# Patient Record
Sex: FEMALE | Race: WHITE | Employment: UNEMPLOYED | URBAN - METROPOLITAN AREA
[De-identification: names, ages, dates, MRNs, and addresses within clinical notes are randomized per-mention and may not be internally consistent; named-entity substitution may affect disease eponyms.]

---

## 2020-12-19 ENCOUNTER — HOSPITAL ENCOUNTER (EMERGENCY)
Age: 55
Discharge: HOME | DRG: 463 | End: 2020-12-19
Attending: EMERGENCY MEDICINE
Payer: MEDICAID

## 2020-12-19 ENCOUNTER — APPOINTMENT (OUTPATIENT)
Dept: CT IMAGING | Age: 55
DRG: 463 | End: 2020-12-19
Payer: MEDICAID

## 2020-12-19 VITALS
HEIGHT: 69 IN | OXYGEN SATURATION: 97 % | RESPIRATION RATE: 12 BRPM | HEART RATE: 89 BPM | WEIGHT: 140 LBS | BODY MASS INDEX: 20.73 KG/M2 | TEMPERATURE: 98.2 F

## 2020-12-19 DIAGNOSIS — N10 ACUTE PYELONEPHRITIS: Primary | ICD-10-CM

## 2020-12-19 LAB
-: ABNORMAL
ABSOLUTE EOS #: 0.02 K/UL (ref 0–0.4)
ABSOLUTE IMMATURE GRANULOCYTE: ABNORMAL K/UL (ref 0–0.3)
ABSOLUTE LYMPH #: 0.75 K/UL (ref 1–4.8)
ABSOLUTE MONO #: 0.84 K/UL (ref 0.1–1.2)
ALBUMIN SERPL-MCNC: 4.2 G/DL (ref 3.5–5.2)
ALBUMIN/GLOBULIN RATIO: 1.4 (ref 1–2.5)
ALP BLD-CCNC: 77 U/L (ref 35–104)
ALT SERPL-CCNC: 20 U/L (ref 5–33)
AMORPHOUS: ABNORMAL
ANION GAP SERPL CALCULATED.3IONS-SCNC: 14 MMOL/L (ref 9–17)
AST SERPL-CCNC: 17 U/L
BACTERIA: ABNORMAL
BASOPHILS # BLD: 0 % (ref 0–2)
BASOPHILS ABSOLUTE: 0.02 K/UL (ref 0–0.2)
BILIRUB SERPL-MCNC: 0.41 MG/DL (ref 0.3–1.2)
BILIRUBIN DIRECT: 0.17 MG/DL
BILIRUBIN URINE: NEGATIVE
BILIRUBIN, INDIRECT: 0.24 MG/DL (ref 0–1)
BUN BLDV-MCNC: 14 MG/DL (ref 6–20)
BUN/CREAT BLD: ABNORMAL (ref 9–20)
CALCIUM SERPL-MCNC: 9.2 MG/DL (ref 8.6–10.4)
CASTS UA: ABNORMAL /LPF
CHLORIDE BLD-SCNC: 102 MMOL/L (ref 98–107)
CO2: 22 MMOL/L (ref 20–31)
COLOR: YELLOW
COMMENT UA: ABNORMAL
CREAT SERPL-MCNC: 0.88 MG/DL (ref 0.5–0.9)
CRYSTALS, UA: ABNORMAL /HPF
DIFFERENTIAL TYPE: ABNORMAL
EOSINOPHILS RELATIVE PERCENT: 0 % (ref 1–4)
EPITHELIAL CELLS UA: ABNORMAL /HPF (ref 0–5)
GFR AFRICAN AMERICAN: >60 ML/MIN
GFR NON-AFRICAN AMERICAN: >60 ML/MIN
GFR SERPL CREATININE-BSD FRML MDRD: ABNORMAL ML/MIN/{1.73_M2}
GFR SERPL CREATININE-BSD FRML MDRD: ABNORMAL ML/MIN/{1.73_M2}
GLOBULIN: NORMAL G/DL (ref 1.5–3.8)
GLUCOSE BLD-MCNC: 112 MG/DL (ref 70–99)
GLUCOSE URINE: NEGATIVE
HCT VFR BLD CALC: 38.4 % (ref 36–46)
HEMOGLOBIN: 13.2 G/DL (ref 12–16)
IMMATURE GRANULOCYTES: ABNORMAL %
KETONES, URINE: ABNORMAL
LEUKOCYTE ESTERASE, URINE: ABNORMAL
LIPASE: 13 U/L (ref 13–60)
LYMPHOCYTES # BLD: 7 % (ref 24–44)
MCH RBC QN AUTO: 30.1 PG (ref 26–34)
MCHC RBC AUTO-ENTMCNC: 34.4 G/DL (ref 31–37)
MCV RBC AUTO: 87.5 FL (ref 80–100)
MONOCYTES # BLD: 8 % (ref 2–11)
MUCUS: ABNORMAL
NITRITE, URINE: POSITIVE
NRBC AUTOMATED: ABNORMAL PER 100 WBC
OTHER OBSERVATIONS UA: ABNORMAL
PDW BLD-RTO: 11.5 % (ref 12.5–15.4)
PH UA: 6 (ref 5–8)
PLATELET # BLD: 218 K/UL (ref 140–450)
PLATELET ESTIMATE: ABNORMAL
PMV BLD AUTO: 9 FL (ref 8–14)
POTASSIUM SERPL-SCNC: 3.9 MMOL/L (ref 3.7–5.3)
PROTEIN UA: ABNORMAL
RBC # BLD: 4.39 M/UL (ref 4–5.2)
RBC # BLD: ABNORMAL 10*6/UL
RBC UA: ABNORMAL /HPF (ref 0–2)
RENAL EPITHELIAL, UA: ABNORMAL /HPF
SEG NEUTROPHILS: 85 % (ref 36–66)
SEGMENTED NEUTROPHILS ABSOLUTE COUNT: 8.47 K/UL (ref 1.8–7.7)
SODIUM BLD-SCNC: 138 MMOL/L (ref 135–144)
SPECIFIC GRAVITY UA: 1.02 (ref 1–1.03)
TOTAL PROTEIN: 7.3 G/DL (ref 6.4–8.3)
TRICHOMONAS: ABNORMAL
TURBIDITY: ABNORMAL
URINE HGB: ABNORMAL
UROBILINOGEN, URINE: NORMAL
WBC # BLD: 10.1 K/UL (ref 3.5–11)
WBC # BLD: ABNORMAL 10*3/UL
WBC UA: ABNORMAL /HPF (ref 0–5)
YEAST: ABNORMAL

## 2020-12-19 PROCEDURE — 87186 SC STD MICRODIL/AGAR DIL: CPT

## 2020-12-19 PROCEDURE — 36415 COLL VENOUS BLD VENIPUNCTURE: CPT

## 2020-12-19 PROCEDURE — 87086 URINE CULTURE/COLONY COUNT: CPT

## 2020-12-19 PROCEDURE — 87077 CULTURE AEROBIC IDENTIFY: CPT

## 2020-12-19 PROCEDURE — 81001 URINALYSIS AUTO W/SCOPE: CPT

## 2020-12-19 PROCEDURE — 6360000004 HC RX CONTRAST MEDICATION: Performed by: EMERGENCY MEDICINE

## 2020-12-19 PROCEDURE — 85025 COMPLETE CBC W/AUTO DIFF WBC: CPT

## 2020-12-19 PROCEDURE — 74177 CT ABD & PELVIS W/CONTRAST: CPT

## 2020-12-19 PROCEDURE — 99284 EMERGENCY DEPT VISIT MOD MDM: CPT

## 2020-12-19 PROCEDURE — 80048 BASIC METABOLIC PNL TOTAL CA: CPT

## 2020-12-19 PROCEDURE — 6360000002 HC RX W HCPCS: Performed by: EMERGENCY MEDICINE

## 2020-12-19 PROCEDURE — 83690 ASSAY OF LIPASE: CPT

## 2020-12-19 PROCEDURE — 80076 HEPATIC FUNCTION PANEL: CPT

## 2020-12-19 PROCEDURE — 96376 TX/PRO/DX INJ SAME DRUG ADON: CPT

## 2020-12-19 PROCEDURE — 2580000003 HC RX 258: Performed by: EMERGENCY MEDICINE

## 2020-12-19 PROCEDURE — 96375 TX/PRO/DX INJ NEW DRUG ADDON: CPT

## 2020-12-19 PROCEDURE — 96365 THER/PROPH/DIAG IV INF INIT: CPT

## 2020-12-19 RX ORDER — SODIUM CHLORIDE 0.9 % (FLUSH) 0.9 %
10 SYRINGE (ML) INJECTION PRN
Status: DISCONTINUED | OUTPATIENT
Start: 2020-12-19 | End: 2020-12-19 | Stop reason: HOSPADM

## 2020-12-19 RX ORDER — CIPROFLOXACIN 500 MG/1
500 TABLET, FILM COATED ORAL 2 TIMES DAILY
COMMUNITY
End: 2020-12-19 | Stop reason: ALTCHOICE

## 2020-12-19 RX ORDER — FENTANYL CITRATE 50 UG/ML
50 INJECTION, SOLUTION INTRAMUSCULAR; INTRAVENOUS ONCE
Status: COMPLETED | OUTPATIENT
Start: 2020-12-19 | End: 2020-12-19

## 2020-12-19 RX ORDER — KETOROLAC TROMETHAMINE 15 MG/ML
15 INJECTION, SOLUTION INTRAMUSCULAR; INTRAVENOUS ONCE
Status: COMPLETED | OUTPATIENT
Start: 2020-12-19 | End: 2020-12-19

## 2020-12-19 RX ORDER — ONDANSETRON 2 MG/ML
4 INJECTION INTRAMUSCULAR; INTRAVENOUS ONCE
Status: COMPLETED | OUTPATIENT
Start: 2020-12-19 | End: 2020-12-19

## 2020-12-19 RX ORDER — 0.9 % SODIUM CHLORIDE 0.9 %
80 INTRAVENOUS SOLUTION INTRAVENOUS ONCE
Status: COMPLETED | OUTPATIENT
Start: 2020-12-19 | End: 2020-12-19

## 2020-12-19 RX ORDER — ONDANSETRON 4 MG/1
4 TABLET, FILM COATED ORAL EVERY 6 HOURS PRN
Qty: 10 TABLET | Refills: 0 | Status: SHIPPED | OUTPATIENT
Start: 2020-12-19

## 2020-12-19 RX ORDER — POLYETHYLENE GLYCOL 3350 17 G/17G
17 POWDER, FOR SOLUTION ORAL DAILY
Qty: 225 G | Refills: 0 | Status: ON HOLD | OUTPATIENT
Start: 2020-12-19 | End: 2020-12-23 | Stop reason: SDUPTHER

## 2020-12-19 RX ORDER — CEPHALEXIN 500 MG/1
500 CAPSULE ORAL 3 TIMES DAILY
Qty: 30 CAPSULE | Refills: 0 | Status: ON HOLD | OUTPATIENT
Start: 2020-12-19 | End: 2020-12-23 | Stop reason: HOSPADM

## 2020-12-19 RX ORDER — HYDROCODONE BITARTRATE AND ACETAMINOPHEN 5; 325 MG/1; MG/1
1 TABLET ORAL EVERY 6 HOURS PRN
Qty: 16 TABLET | Refills: 0 | Status: SHIPPED | OUTPATIENT
Start: 2020-12-19 | End: 2020-12-23

## 2020-12-19 RX ORDER — 0.9 % SODIUM CHLORIDE 0.9 %
1000 INTRAVENOUS SOLUTION INTRAVENOUS ONCE
Status: COMPLETED | OUTPATIENT
Start: 2020-12-19 | End: 2020-12-19

## 2020-12-19 RX ADMIN — KETOROLAC TROMETHAMINE 15 MG: 15 INJECTION, SOLUTION INTRAMUSCULAR; INTRAVENOUS at 13:03

## 2020-12-19 RX ADMIN — IOPAMIDOL 75 ML: 755 INJECTION, SOLUTION INTRAVENOUS at 12:05

## 2020-12-19 RX ADMIN — FENTANYL CITRATE 50 MCG: 50 INJECTION, SOLUTION INTRAMUSCULAR; INTRAVENOUS at 10:29

## 2020-12-19 RX ADMIN — SODIUM CHLORIDE 1000 ML: 9 INJECTION, SOLUTION INTRAVENOUS at 10:23

## 2020-12-19 RX ADMIN — Medication 10 ML: at 12:06

## 2020-12-19 RX ADMIN — ONDANSETRON 4 MG: 2 INJECTION INTRAMUSCULAR; INTRAVENOUS at 13:02

## 2020-12-19 RX ADMIN — SODIUM CHLORIDE 80 ML: 9 INJECTION, SOLUTION INTRAVENOUS at 12:06

## 2020-12-19 RX ADMIN — ONDANSETRON 4 MG: 2 INJECTION INTRAMUSCULAR; INTRAVENOUS at 10:24

## 2020-12-19 RX ADMIN — CEFTRIAXONE SODIUM 1 G: 1 INJECTION, POWDER, FOR SOLUTION INTRAMUSCULAR; INTRAVENOUS at 11:44

## 2020-12-19 ASSESSMENT — ENCOUNTER SYMPTOMS
COUGH: 0
NAUSEA: 1
SHORTNESS OF BREATH: 0

## 2020-12-19 ASSESSMENT — PAIN SCALES - GENERAL
PAINLEVEL_OUTOF10: 3
PAINLEVEL_OUTOF10: 5
PAINLEVEL_OUTOF10: 5
PAINLEVEL_OUTOF10: 4
PAINLEVEL_OUTOF10: 5

## 2020-12-19 NOTE — ED PROVIDER NOTES
[facility] ED  EMERGENCY DEPARTMENT ENCOUNTER      Pt Name: Sung Genao  MRN: [id number]  [place] 1965  Date of evaluation: 12/19/2020  Provider: Hardik Bourgeois MD    CHIEF COMPLAINT     Chief Complaint   Patient presents with    Flank Pain         HISTORY OF PRESENT ILLNESS   (Location/Symptom, Timing/Onset, Context/Setting,Quality, Duration, Modifying Factors, Severity)  Note limiting factors. Sung Genao is a 54 y.o. female who presents to the emergency department with a chief complaint of left-sided abdominal pain that she states she cannot localize well. This started about a week ago. She was diagnosed with urinary tract infection and was placed on Cipro. She has taken 5 doses but it makes her nauseated so she stopped. She denies vomiting or fever. The history is provided by the patient and a friend. Nursing Notes werereviewed. REVIEW OF SYSTEMS    (2-9 systems for level 4, 10 or more for level 5)     Review of Systems   Constitutional: Positive for fatigue. Negative for fever. Respiratory: Negative for cough and shortness of breath. Gastrointestinal: Positive for nausea. Musculoskeletal: Negative for myalgias. All other systems reviewed and are negative. Except as noted above the remainder of the review of systems was reviewed and negative. PAST MEDICAL HISTORY   History reviewed. No pertinent past medical history. SURGICALHISTORY     History reviewed. No pertinent surgical history. CURRENT MEDICATIONS       Discharge Medication List as of 12/19/2020  2:06 PM      CONTINUE these medications which have NOT CHANGED    Details   estrogens, conjugated, (PREMARIN) 0.3 MG tablet Take 0.3 mg by mouth dailyHistorical Med      progesterone (PROMETRIUM) 200 MG capsule Take 400 mg by mouth dailyHistorical Med             ALLERGIES     Aspirin and Pcn [penicillins]    FAMILY HISTORY     History reviewed. No pertinent family history.        SOCIAL HISTORY Social History     Socioeconomic History    Marital status: Single     Spouse name: None    Number of children: None    Years of education: None    Highest education level: None   Occupational History    None   Social Needs    Financial resource strain: None    Food insecurity     Worry: None     Inability: None    Transportation needs     Medical: None     Non-medical: None   Tobacco Use    Smoking status: Never Smoker    Smokeless tobacco: Never Used   Substance and Sexual Activity    Alcohol use: Yes     Comment: 1/day    Drug use: Never    Sexual activity: Never   Lifestyle    Physical activity     Days per week: None     Minutes per session: None    Stress: None   Relationships    Social connections     Talks on phone: None     Gets together: None     Attends Mandaeism service: None     Active member of club or organization: None     Attends meetings of clubs or organizations: None     Relationship status: None    Intimate partner violence     Fear of current or ex partner: None     Emotionally abused: None     Physically abused: None     Forced sexual activity: None   Other Topics Concern    None   Social History Narrative    None       SCREENINGS             PHYSICAL EXAM    (up to 7 for level 4, 8 or more for level 5)     ED Triage Vitals [12/19/20 0958]   BP Temp Temp Source Pulse Resp SpO2 Height Weight   -- 98.2 °F (36.8 °C) Oral 89 12 97 % 5' 9\" (1.753 m) 140 lb (63.5 kg)       Physical Exam  Vitals signs reviewed. Constitutional:       Appearance: She is ill-appearing. HENT:      Head: Normocephalic. Right Ear: External ear normal.      Left Ear: External ear normal.      Nose: Nose normal.   Eyes:      Extraocular Movements: Extraocular movements intact. Neck:      Musculoskeletal: Neck supple. Cardiovascular:      Rate and Rhythm: Normal rate and regular rhythm. Pulmonary:      Effort: Pulmonary effort is normal.      Breath sounds: Normal breath sounds. Abdominal:      Comments: Soft, diffusely tender more so in the left flank and left lower quadrant and left CVA region. No peritoneal signs or rigidity. No organomegaly or fluid wave. Musculoskeletal: Normal range of motion. Skin:     General: Skin is warm and dry. Neurological:      General: No focal deficit present. Mental Status: She is alert and oriented to person, place, and time. DIAGNOSTIC RESULTS     EKG: All EKG's are interpreted by the Emergency Department Physician who either signs orCo-signs this chart in the absence of a cardiologist.    RADIOLOGY:   Non-plain film images such as CT, Ultrasound and MRI are read by the radiologist. Plain radiographic images are visualized and preliminarily interpreted by the emergency physician with the below findings:    Interpretation per the Radiologist below, ifavailable at the time of this note:    CT ABDOMEN PELVIS W IV CONTRAST Additional Contrast? None   Final Result   1. Left pyelonephritis. 2. Suspicion for left ureteritis and cystitis. 3. Endometrial thickening for age. Recommend further evaluation with   sonography on a nonemergent basis. Uterine heterogeneous enhancement and   relative enlargement also be assessed at that time, most likely due to   fibroids and/or adenomyosis. 4. Additional incidental findings as above for which no dedicated follow-up   is recommend.                ED BEDSIDE ULTRASOUND:   Performed by ED Physician - none    LABS:  Labs Reviewed   CBC WITH AUTO DIFFERENTIAL - Abnormal; Notable for the following components:       Result Value    RDW 11.5 (*)     Seg Neutrophils 85 (*)     Lymphocytes 7 (*)     Eosinophils % 0 (*)     Segs Absolute 8.47 (*)     Absolute Lymph # 0.75 (*)     All other components within normal limits   BASIC METABOLIC PANEL - Abnormal; Notable for the following components:    Glucose 112 (*)     All other components within normal limits   URINALYSIS - Abnormal; Notable for the following components:    Turbidity UA CLOUDY (*)     Ketones, Urine MODERATE (*)     Urine Hgb SMALL (*)     Protein, UA 1+ (*)     Nitrite, Urine POSITIVE (*)     Leukocyte Esterase, Urine MODERATE (*)     All other components within normal limits   MICROSCOPIC URINALYSIS - Abnormal; Notable for the following components:    Bacteria, UA MANY (*)     Mucus, UA 1+ (*)     All other components within normal limits   HEPATIC FUNCTION PANEL   LIPASE       All other labs were within normal range ornot returned as of this dictation. EMERGENCY DEPARTMENT COURSE and DIFFERENTIAL DIAGNOSIS/MDM:   Vitals:    Vitals:    12/19/20 0958   Pulse: 89   Resp: 12   Temp: 98.2 °F (36.8 °C)   TempSrc: Oral   SpO2: 97%   Weight: 63.5 kg (140 lb)   Height: 5' 9\" (1.753 m)            Urinalysis is consistent with infection and CT scan of the brain is consistent with left pyelonephritis. Patient likely has a fibroid uterus. She is treated with IV Rocephin in the ED and for pain management was given IV fentanyl, IV Toradol and 2 doses of IV Zofran 4 mg each. She also has been bolused with a liter of IV normal saline and feels better. Upon discharge she is placed on Zofran and Keflex. She is also prescribed Norco for pain and MiraLAX to prevent constipation. Patient was living in [facility] and has moved up into the region with a significant other who lives in [facility] which is her eventual destination. She was passing through [place] when she got sick. She will be able to [facility] a [facility] care physician [facility] with the plan to spend a couple days in the areas until she gets better. She is advised to return to the emergency department anytime for worsening of symptoms. MDM    CONSULTS:  None    PROCEDURES:  Unlessotherwise noted below, none     Procedures    FINAL IMPRESSION      1.  Acute pyelonephritis          DISPOSITION/PLAN   DISPOSITION Decision To Discharge 12/19/2020 02:03:47 PM      PATIENT REFERRED TO:  Your physician    In 1 week        DISCHARGE MEDICATIONS:         Problem List:  There is no problem list on file for this patient. Summation      Patient Course: Discharged. ED Medicationsadministered this visit:    Medications   sodium chloride flush 0.9 % injection 10 mL (10 mLs Intravenous Given 12/19/20 1206)   0.9 % sodium chloride bolus (0 mLs Intravenous Stopped 12/19/20 1123)   ondansetron (ZOFRAN) injection 4 mg (4 mg Intravenous Given 12/19/20 1024)   fentaNYL (SUBLIMAZE) injection 50 mcg (50 mcg Intravenous Given 12/19/20 1029)   cefTRIAXone (ROCEPHIN) 1 g IVPB in 50 mL D5W minibag (0 g Intravenous Stopped 12/19/20 1214)   0.9 % sodium chloride bolus (0 mLs Intravenous Stopped 12/19/20 1207)   iopamidol (ISOVUE-370) 76 % injection 75 mL (75 mLs Intravenous Given 12/19/20 1205)   ondansetron (ZOFRAN) injection 4 mg (4 mg Intravenous Given 12/19/20 1302)   ketorolac (TORADOL) injection 15 mg (15 mg Intravenous Given 12/19/20 1303)       New Prescriptions from this visit:    Discharge Medication List as of 12/19/2020  2:06 PM      START taking these medications    Details   ondansetron (ZOFRAN) 4 MG tablet Take 1 tablet by mouth every 6 hours as needed for Nausea or Vomiting, Disp-10 tablet, R-0Print      cephALEXin (KEFLEX) 500 MG capsule Take 1 capsule by mouth 3 times daily, Disp-30 capsule, R-0Print      HYDROcodone-acetaminophen (NORCO) 5-325 MG per tablet Take 1 tablet by mouth every 6 hours as needed for Pain for up to 4 days. , Disp-16 tablet, R-0Print      polyethylene glycol (MIRALAX) 17 GM/SCOOP powder Take 17 g by mouth daily PRN constipation, Disp-225 g, R-0Print             Follow-up:  Your physician    In 1 week          Final Impression:   1.  Acute pyelonephritis               (Please note that portions of this note were completed with a voice recognitionprogram.  Efforts were made to edit the dictations but occasionally words are mis-transcribed.)    Izabella Lauren MD

## 2020-12-19 NOTE — ED NOTES
Patient to ED with c/o left flank pain with urinary symptoms for past week. Was seen via telemed and giben Rx for Cipro for a suspected UTI. After 3 days of ATB, Cipro dose increased to 500mg from 250mg. Today, patient in increased pain, reports having frequent urination and that urine is discolored. No h/o kidney stones. Has fine, red, raised rash to chest from Cipro.        Morro Escobedo RN  12/19/20 5928

## 2020-12-21 ENCOUNTER — APPOINTMENT (OUTPATIENT)
Dept: CT IMAGING | Age: 55
DRG: 463 | End: 2020-12-21
Payer: MEDICAID

## 2020-12-21 ENCOUNTER — APPOINTMENT (OUTPATIENT)
Dept: ULTRASOUND IMAGING | Age: 55
DRG: 463 | End: 2020-12-21
Payer: MEDICAID

## 2020-12-21 ENCOUNTER — HOSPITAL ENCOUNTER (INPATIENT)
Age: 55
LOS: 2 days | Discharge: HOME | DRG: 463 | End: 2020-12-23
Attending: EMERGENCY MEDICINE | Admitting: INTERNAL MEDICINE
Payer: MEDICAID

## 2020-12-21 DIAGNOSIS — N12 PYELONEPHRITIS OF LEFT KIDNEY: Primary | ICD-10-CM

## 2020-12-21 DIAGNOSIS — R51.9 RIGHT-SIDED HEADACHE: ICD-10-CM

## 2020-12-21 LAB
ABSOLUTE EOS #: 0.1 K/UL (ref 0–0.4)
ABSOLUTE IMMATURE GRANULOCYTE: ABNORMAL K/UL (ref 0–0.3)
ABSOLUTE LYMPH #: 0.6 K/UL (ref 1–4.8)
ABSOLUTE MONO #: 0.5 K/UL (ref 0.1–1.2)
ALBUMIN SERPL-MCNC: 3.4 G/DL (ref 3.5–5.2)
ALBUMIN/GLOBULIN RATIO: 1 (ref 1–2.5)
ALP BLD-CCNC: 122 U/L (ref 35–104)
ALT SERPL-CCNC: 93 U/L (ref 5–33)
ANION GAP SERPL CALCULATED.3IONS-SCNC: 13 MMOL/L (ref 9–17)
AST SERPL-CCNC: 57 U/L
BASOPHILS # BLD: 1 % (ref 0–2)
BASOPHILS ABSOLUTE: 0 K/UL (ref 0–0.2)
BILIRUB SERPL-MCNC: 0.22 MG/DL (ref 0.3–1.2)
BILIRUBIN DIRECT: 0.1 MG/DL
BILIRUBIN URINE: NEGATIVE
BILIRUBIN, INDIRECT: 0.12 MG/DL (ref 0–1)
BUN BLDV-MCNC: 11 MG/DL (ref 6–20)
BUN/CREAT BLD: ABNORMAL (ref 9–20)
CALCIUM SERPL-MCNC: 8.7 MG/DL (ref 8.6–10.4)
CHLORIDE BLD-SCNC: 104 MMOL/L (ref 98–107)
CO2: 23 MMOL/L (ref 20–31)
COLOR: YELLOW
COMMENT UA: NORMAL
CREAT SERPL-MCNC: 0.88 MG/DL (ref 0.5–0.9)
DIFFERENTIAL TYPE: ABNORMAL
EOSINOPHILS RELATIVE PERCENT: 2 % (ref 1–4)
GFR AFRICAN AMERICAN: >60 ML/MIN
GFR NON-AFRICAN AMERICAN: >60 ML/MIN
GFR SERPL CREATININE-BSD FRML MDRD: ABNORMAL ML/MIN/{1.73_M2}
GFR SERPL CREATININE-BSD FRML MDRD: ABNORMAL ML/MIN/{1.73_M2}
GLOBULIN: ABNORMAL G/DL (ref 1.5–3.8)
GLUCOSE BLD-MCNC: 110 MG/DL (ref 70–99)
GLUCOSE URINE: NEGATIVE
HCG(URINE) PREGNANCY TEST: NEGATIVE
HCT VFR BLD CALC: 38.4 % (ref 36–46)
HEMOGLOBIN: 12.9 G/DL (ref 12–16)
IMMATURE GRANULOCYTES: ABNORMAL %
KETONES, URINE: NEGATIVE
LACTIC ACID, SEPSIS WHOLE BLOOD: NORMAL MMOL/L (ref 0.5–1.9)
LACTIC ACID, SEPSIS: 1.2 MMOL/L (ref 0.5–1.9)
LEUKOCYTE ESTERASE, URINE: NEGATIVE
LIPASE: 13 U/L (ref 13–60)
LYMPHOCYTES # BLD: 16 % (ref 24–44)
MCH RBC QN AUTO: 29.9 PG (ref 26–34)
MCHC RBC AUTO-ENTMCNC: 33.5 G/DL (ref 31–37)
MCV RBC AUTO: 89.2 FL (ref 80–100)
MONOCYTES # BLD: 13 % (ref 2–11)
NITRITE, URINE: NEGATIVE
NRBC AUTOMATED: ABNORMAL PER 100 WBC
PDW BLD-RTO: 12 % (ref 12.5–15.4)
PH UA: 6 (ref 5–8)
PLATELET # BLD: 262 K/UL (ref 140–450)
PLATELET ESTIMATE: ABNORMAL
PMV BLD AUTO: 7.3 FL (ref 6–12)
POTASSIUM SERPL-SCNC: 3.8 MMOL/L (ref 3.7–5.3)
PROTEIN UA: NEGATIVE
RBC # BLD: 4.31 M/UL (ref 4–5.2)
RBC # BLD: ABNORMAL 10*6/UL
SARS-COV-2, RAPID: NOT DETECTED
SARS-COV-2: NORMAL
SARS-COV-2: NORMAL
SEG NEUTROPHILS: 68 % (ref 36–66)
SEGMENTED NEUTROPHILS ABSOLUTE COUNT: 2.5 K/UL (ref 1.8–7.7)
SODIUM BLD-SCNC: 140 MMOL/L (ref 135–144)
SOURCE: NORMAL
SPECIFIC GRAVITY UA: 1.01 (ref 1–1.03)
TOTAL PROTEIN: 6.9 G/DL (ref 6.4–8.3)
TURBIDITY: CLEAR
URINE HGB: NEGATIVE
UROBILINOGEN, URINE: NORMAL
WBC # BLD: 3.6 K/UL (ref 3.5–11)
WBC # BLD: ABNORMAL 10*3/UL

## 2020-12-21 PROCEDURE — 6360000002 HC RX W HCPCS: Performed by: NURSE PRACTITIONER

## 2020-12-21 PROCEDURE — 85025 COMPLETE CBC W/AUTO DIFF WBC: CPT

## 2020-12-21 PROCEDURE — 2580000003 HC RX 258: Performed by: PHYSICIAN ASSISTANT

## 2020-12-21 PROCEDURE — 93976 VASCULAR STUDY: CPT

## 2020-12-21 PROCEDURE — 36415 COLL VENOUS BLD VENIPUNCTURE: CPT

## 2020-12-21 PROCEDURE — 99285 EMERGENCY DEPT VISIT HI MDM: CPT

## 2020-12-21 PROCEDURE — 80076 HEPATIC FUNCTION PANEL: CPT

## 2020-12-21 PROCEDURE — 96376 TX/PRO/DX INJ SAME DRUG ADON: CPT

## 2020-12-21 PROCEDURE — 81003 URINALYSIS AUTO W/O SCOPE: CPT

## 2020-12-21 PROCEDURE — 81025 URINE PREGNANCY TEST: CPT

## 2020-12-21 PROCEDURE — 80048 BASIC METABOLIC PNL TOTAL CA: CPT

## 2020-12-21 PROCEDURE — 1210000000 HC MED SURG R&B

## 2020-12-21 PROCEDURE — 83605 ASSAY OF LACTIC ACID: CPT

## 2020-12-21 PROCEDURE — 87086 URINE CULTURE/COLONY COUNT: CPT

## 2020-12-21 PROCEDURE — 6360000004 HC RX CONTRAST MEDICATION: Performed by: PHYSICIAN ASSISTANT

## 2020-12-21 PROCEDURE — 6360000002 HC RX W HCPCS: Performed by: PHYSICIAN ASSISTANT

## 2020-12-21 PROCEDURE — 70450 CT HEAD/BRAIN W/O DYE: CPT

## 2020-12-21 PROCEDURE — 6370000000 HC RX 637 (ALT 250 FOR IP): Performed by: NURSE PRACTITIONER

## 2020-12-21 PROCEDURE — 2580000003 HC RX 258: Performed by: NURSE PRACTITIONER

## 2020-12-21 PROCEDURE — U0002 COVID-19 LAB TEST NON-CDC: HCPCS

## 2020-12-21 PROCEDURE — 76705 ECHO EXAM OF ABDOMEN: CPT

## 2020-12-21 PROCEDURE — U0003 INFECTIOUS AGENT DETECTION BY NUCLEIC ACID (DNA OR RNA); SEVERE ACUTE RESPIRATORY SYNDROME CORONAVIRUS 2 (SARS-COV-2) (CORONAVIRUS DISEASE [COVID-19]), AMPLIFIED PROBE TECHNIQUE, MAKING USE OF HIGH THROUGHPUT TECHNOLOGIES AS DESCRIBED BY CMS-2020-01-R: HCPCS

## 2020-12-21 PROCEDURE — 83690 ASSAY OF LIPASE: CPT

## 2020-12-21 PROCEDURE — 76830 TRANSVAGINAL US NON-OB: CPT

## 2020-12-21 PROCEDURE — 96365 THER/PROPH/DIAG IV INF INIT: CPT

## 2020-12-21 PROCEDURE — 74177 CT ABD & PELVIS W/CONTRAST: CPT

## 2020-12-21 PROCEDURE — 96375 TX/PRO/DX INJ NEW DRUG ADDON: CPT

## 2020-12-21 PROCEDURE — 76856 US EXAM PELVIC COMPLETE: CPT

## 2020-12-21 RX ORDER — 0.9 % SODIUM CHLORIDE 0.9 %
80 INTRAVENOUS SOLUTION INTRAVENOUS ONCE
Status: COMPLETED | OUTPATIENT
Start: 2020-12-21 | End: 2020-12-21

## 2020-12-21 RX ORDER — ONDANSETRON 2 MG/ML
4 INJECTION INTRAMUSCULAR; INTRAVENOUS ONCE
Status: COMPLETED | OUTPATIENT
Start: 2020-12-21 | End: 2020-12-21

## 2020-12-21 RX ORDER — ACETAMINOPHEN 325 MG/1
650 TABLET ORAL EVERY 6 HOURS PRN
Status: DISCONTINUED | OUTPATIENT
Start: 2020-12-21 | End: 2020-12-23 | Stop reason: HOSPADM

## 2020-12-21 RX ORDER — FENTANYL CITRATE 50 UG/ML
25 INJECTION, SOLUTION INTRAMUSCULAR; INTRAVENOUS ONCE
Status: COMPLETED | OUTPATIENT
Start: 2020-12-21 | End: 2020-12-21

## 2020-12-21 RX ORDER — ACETAMINOPHEN 650 MG/1
650 SUPPOSITORY RECTAL EVERY 6 HOURS PRN
Status: DISCONTINUED | OUTPATIENT
Start: 2020-12-21 | End: 2020-12-23 | Stop reason: HOSPADM

## 2020-12-21 RX ORDER — ONDANSETRON 2 MG/ML
4 INJECTION INTRAMUSCULAR; INTRAVENOUS EVERY 6 HOURS PRN
Status: DISCONTINUED | OUTPATIENT
Start: 2020-12-21 | End: 2020-12-23 | Stop reason: HOSPADM

## 2020-12-21 RX ORDER — 0.9 % SODIUM CHLORIDE 0.9 %
1000 INTRAVENOUS SOLUTION INTRAVENOUS ONCE
Status: COMPLETED | OUTPATIENT
Start: 2020-12-21 | End: 2020-12-21

## 2020-12-21 RX ORDER — KETOROLAC TROMETHAMINE 30 MG/ML
30 INJECTION, SOLUTION INTRAMUSCULAR; INTRAVENOUS ONCE
Status: DISCONTINUED | OUTPATIENT
Start: 2020-12-21 | End: 2020-12-21

## 2020-12-21 RX ORDER — SODIUM CHLORIDE 0.9 % (FLUSH) 0.9 %
10 SYRINGE (ML) INJECTION EVERY 12 HOURS SCHEDULED
Status: DISCONTINUED | OUTPATIENT
Start: 2020-12-21 | End: 2020-12-23 | Stop reason: HOSPADM

## 2020-12-21 RX ORDER — SODIUM CHLORIDE 9 MG/ML
INJECTION, SOLUTION INTRAVENOUS CONTINUOUS
Status: DISCONTINUED | OUTPATIENT
Start: 2020-12-21 | End: 2020-12-23 | Stop reason: HOSPADM

## 2020-12-21 RX ORDER — SODIUM CHLORIDE 0.9 % (FLUSH) 0.9 %
10 SYRINGE (ML) INJECTION PRN
Status: DISCONTINUED | OUTPATIENT
Start: 2020-12-21 | End: 2020-12-21

## 2020-12-21 RX ORDER — PROMETHAZINE HYDROCHLORIDE 25 MG/1
12.5 TABLET ORAL EVERY 6 HOURS PRN
Status: DISCONTINUED | OUTPATIENT
Start: 2020-12-21 | End: 2020-12-23 | Stop reason: HOSPADM

## 2020-12-21 RX ORDER — MORPHINE SULFATE 4 MG/ML
4 INJECTION, SOLUTION INTRAMUSCULAR; INTRAVENOUS ONCE
Status: COMPLETED | OUTPATIENT
Start: 2020-12-21 | End: 2020-12-21

## 2020-12-21 RX ORDER — KETOROLAC TROMETHAMINE 30 MG/ML
30 INJECTION, SOLUTION INTRAMUSCULAR; INTRAVENOUS ONCE
Status: COMPLETED | OUTPATIENT
Start: 2020-12-21 | End: 2020-12-21

## 2020-12-21 RX ORDER — SODIUM CHLORIDE 0.9 % (FLUSH) 0.9 %
10 SYRINGE (ML) INJECTION PRN
Status: DISCONTINUED | OUTPATIENT
Start: 2020-12-21 | End: 2020-12-23 | Stop reason: HOSPADM

## 2020-12-21 RX ORDER — POLYETHYLENE GLYCOL 3350 17 G/17G
17 POWDER, FOR SOLUTION ORAL DAILY
Status: DISCONTINUED | OUTPATIENT
Start: 2020-12-21 | End: 2020-12-22

## 2020-12-21 RX ORDER — NICOTINE 21 MG/24HR
1 PATCH, TRANSDERMAL 24 HOURS TRANSDERMAL DAILY PRN
Status: DISCONTINUED | OUTPATIENT
Start: 2020-12-21 | End: 2020-12-23 | Stop reason: HOSPADM

## 2020-12-21 RX ORDER — DIPHENHYDRAMINE HYDROCHLORIDE 50 MG/ML
25 INJECTION INTRAMUSCULAR; INTRAVENOUS ONCE
Status: COMPLETED | OUTPATIENT
Start: 2020-12-21 | End: 2020-12-21

## 2020-12-21 RX ADMIN — PIPERACILLIN AND TAZOBACTAM 3.38 G: 3; .375 INJECTION, POWDER, LYOPHILIZED, FOR SOLUTION INTRAVENOUS at 20:37

## 2020-12-21 RX ADMIN — SODIUM CHLORIDE 1000 ML: 9 INJECTION, SOLUTION INTRAVENOUS at 14:48

## 2020-12-21 RX ADMIN — ONDANSETRON 4 MG: 2 INJECTION INTRAMUSCULAR; INTRAVENOUS at 17:18

## 2020-12-21 RX ADMIN — ONDANSETRON 4 MG: 2 INJECTION INTRAMUSCULAR; INTRAVENOUS at 23:12

## 2020-12-21 RX ADMIN — FENTANYL CITRATE 25 MCG: 50 INJECTION, SOLUTION INTRAMUSCULAR; INTRAVENOUS at 14:48

## 2020-12-21 RX ADMIN — POLYETHYLENE GLYCOL 3350 17 G: 17 POWDER, FOR SOLUTION ORAL at 23:11

## 2020-12-21 RX ADMIN — SODIUM CHLORIDE 1000 ML: 9 INJECTION, SOLUTION INTRAVENOUS at 15:55

## 2020-12-21 RX ADMIN — DIPHENHYDRAMINE HYDROCHLORIDE 25 MG: 50 INJECTION, SOLUTION INTRAMUSCULAR; INTRAVENOUS at 17:18

## 2020-12-21 RX ADMIN — MORPHINE SULFATE 4 MG: 4 INJECTION INTRAVENOUS at 21:24

## 2020-12-21 RX ADMIN — KETOROLAC TROMETHAMINE 30 MG: 30 INJECTION, SOLUTION INTRAMUSCULAR at 17:39

## 2020-12-21 RX ADMIN — IOPAMIDOL 75 ML: 755 INJECTION, SOLUTION INTRAVENOUS at 16:53

## 2020-12-21 RX ADMIN — CEFTRIAXONE SODIUM 1 G: 1 INJECTION, POWDER, FOR SOLUTION INTRAMUSCULAR; INTRAVENOUS at 17:05

## 2020-12-21 RX ADMIN — Medication 10 ML: at 16:53

## 2020-12-21 RX ADMIN — ONDANSETRON 4 MG: 2 INJECTION INTRAMUSCULAR; INTRAVENOUS at 14:49

## 2020-12-21 RX ADMIN — SODIUM CHLORIDE 80 ML: 9 INJECTION, SOLUTION INTRAVENOUS at 16:53

## 2020-12-21 RX ADMIN — FENTANYL CITRATE 25 MCG: 50 INJECTION, SOLUTION INTRAMUSCULAR; INTRAVENOUS at 16:35

## 2020-12-21 RX ADMIN — SODIUM CHLORIDE: 9 INJECTION, SOLUTION INTRAVENOUS at 20:36

## 2020-12-21 ASSESSMENT — ENCOUNTER SYMPTOMS
COUGH: 0
SORE THROAT: 0
NAUSEA: 0
EYE REDNESS: 0
ABDOMINAL PAIN: 1
VOMITING: 0
SHORTNESS OF BREATH: 0
EYE DISCHARGE: 0

## 2020-12-21 ASSESSMENT — PAIN DESCRIPTION - LOCATION
LOCATION: EYE;HEAD
LOCATION: ABDOMEN;BACK

## 2020-12-21 ASSESSMENT — PAIN SCALES - GENERAL
PAINLEVEL_OUTOF10: 7
PAINLEVEL_OUTOF10: 10
PAINLEVEL_OUTOF10: 8
PAINLEVEL_OUTOF10: 7
PAINLEVEL_OUTOF10: 10
PAINLEVEL_OUTOF10: 6
PAINLEVEL_OUTOF10: 4
PAINLEVEL_OUTOF10: 6
PAINLEVEL_OUTOF10: 2

## 2020-12-21 ASSESSMENT — PAIN DESCRIPTION - PAIN TYPE
TYPE: ACUTE PAIN
TYPE: ACUTE PAIN

## 2020-12-21 ASSESSMENT — PAIN - FUNCTIONAL ASSESSMENT: PAIN_FUNCTIONAL_ASSESSMENT: PREVENTS OR INTERFERES SOME ACTIVE ACTIVITIES AND ADLS

## 2020-12-21 ASSESSMENT — PAIN DESCRIPTION - ORIENTATION
ORIENTATION: LEFT;LOWER
ORIENTATION: RIGHT

## 2020-12-21 ASSESSMENT — PAIN DESCRIPTION - FREQUENCY: FREQUENCY: CONTINUOUS

## 2020-12-21 ASSESSMENT — PAIN SCALES - WONG BAKER: WONGBAKER_NUMERICALRESPONSE: 2

## 2020-12-21 ASSESSMENT — PAIN DESCRIPTION - DESCRIPTORS: DESCRIPTORS: OTHER (COMMENT)

## 2020-12-21 NOTE — ED PROVIDER NOTES
(2-9 systems for level 4, 10 or more for level 5)    Review of Systems   Constitutional: Positive for chills. Sweats   HENT: Negative for congestion, ear pain and sore throat. Eyes: Negative for discharge and redness. Respiratory: Negative for cough and shortness of breath. Cardiovascular: Negative for chest pain. Gastrointestinal: Positive for abdominal pain. Negative for nausea and vomiting. Genitourinary: Positive for flank pain and hematuria. Musculoskeletal: Negative for joint swelling and neck pain. Skin: Positive for rash. Neurological: Positive for headaches. Negative for seizures and syncope. All other systems reviewed and are negative. PAST MEDICAL HISTORY   Left pyelonephritis    SURGICAL HISTORY      has no past surgical history on file. CURRENT MEDICATIONS       Current Discharge Medication List      CONTINUE these medications which have NOT CHANGED    Details   estrogens, conjugated, (PREMARIN) 0.3 MG tablet Take 0.3 mg by mouth daily      progesterone (PROMETRIUM) 200 MG capsule Take 400 mg by mouth daily      ondansetron (ZOFRAN) 4 MG tablet Take 1 tablet by mouth every 6 hours as needed for Nausea or Vomiting  Qty: 10 tablet, Refills: 0      cephALEXin (KEFLEX) 500 MG capsule Take 1 capsule by mouth 3 times daily  Qty: 30 capsule, Refills: 0      HYDROcodone-acetaminophen (NORCO) 5-325 MG per tablet Take 1 tablet by mouth every 6 hours as needed for Pain for up to 4 days. Qty: 16 tablet, Refills: 0    Associated Diagnoses: Acute pyelonephritis      polyethylene glycol (MIRALAX) 17 GM/SCOOP powder Take 17 g by mouth daily PRN constipation  Qty: 225 g, Refills: 0           ALLERGIES     is allergic to aspirin and pcn [penicillins]. FAMILY HISTORY     Not relevant to the current problem. SOCIAL HISTORY      reports that she has never smoked. She has never used smokeless tobacco. She reports current alcohol use. She reports that she does not use drugs. PHYSICAL EXAM     (7 for level 4, 8 or more for level 5)    ED Triage Vitals [12/21/20 1420]   BP Temp Temp Source Pulse Resp SpO2 Height Weight   123/73 98.1 °F (36.7 °C) Oral 75 14 100 % 5' 9\" (1.753 m) 140 lb (63.5 kg)     Physical Exam  Vitals signs reviewed. Constitutional:       Appearance: She is well-developed and normal weight. Comments: Mildly uncomfortable. HENT:      Head: Normocephalic and atraumatic. Eyes:      General: No scleral icterus. Neck:      Musculoskeletal: Normal range of motion and neck supple. Cardiovascular:      Rate and Rhythm: Normal rate and regular rhythm. Heart sounds: Normal heart sounds. Pulmonary:      Effort: Pulmonary effort is normal. No respiratory distress. Breath sounds: Normal breath sounds. Abdominal:      General: Bowel sounds are normal.      Palpations: Abdomen is soft. Tenderness: There is abdominal tenderness in the suprapubic area and left lower quadrant. There is left CVA tenderness. There is no right CVA tenderness. Musculoskeletal:      Comments: Normal ambulation. Skin:     Findings: Rash present. Comments: Scattered 1-2 mm maculopapular lesions over the upper and lower back with scattered excoriations. Neurological:      General: No focal deficit present. Mental Status: She is alert and oriented to person, place, and time. Psychiatric:         Mood and Affect: Mood normal.         Behavior: Behavior normal.       DIAGNOSTIC RESULTS     RADIOLOGY:   Radiology images were visualized by myself. The Radiologist interpretations were reviewed and are as follows:     CT ABDOMEN PELVIS W IV CONTRAST Additional Contrast? None (Final result)  Result time 12/21/20 17:20:06  Final result by Arash Mullen MD (12/21/20 17:20:06)                Impression:    Left-sided pyelonephritis and mild thickening of the left ureter and bladder   likely secondary findings. No loculated perinephric fluid collections. Moderate retained stool throughout colon. Heterogeneous enlarged uterus again identified better evaluated on recent   pelvic ultrasound. Narrative:    EXAMINATION:   CT OF THE ABDOMEN AND PELVIS WITH CONTRAST 12/21/2020 3:50 pm     TECHNIQUE:   CT of the abdomen and pelvis was performed with the administration of   intravenous contrast. Multiplanar reformatted images are provided for review. Dose modulation, iterative reconstruction, and/or weight based adjustment of   the mA/kV was utilized to reduce the radiation dose to as low as reasonably   achievable. COMPARISON:   Pelvic ultrasound 12/21/2020, CT abdomen pelvis 12/19/2022     HISTORY:   ORDERING SYSTEM PROVIDED HISTORY: left sided flank and abdominal pain   TECHNOLOGIST PROVIDED HISTORY:   left sided flank and abdominal pain     Reason for Exam: left side abdominal and flank pain, pt seen 2 days ago in ER   Acuity: Acute   Type of Exam: Subsequent/Follow-up     FINDINGS:   Lower Chest: Lung bases are clear.  Small hiatal hernia. Organs: Scattered hepatic hypodensities not significant changed.  Largest 1.5   cm.  Additional smaller lesions too small to be accurately characterized but   likely cysts or hemangiomas require no further follow-up or workup. Subcentimeter renal lesions too small to be accurately characterize favor   cyst.  Heterogeneous enhancement of the left kidney most pronounced involving   left lower pole has progressed consistent with underlying pyelonephritis.  No   loculated abscess identified.  Mild right haziness of the left renal   collecting system and proximal ureter.  Left renal vein is unremarkable in   opacification. GI/Bowel: Moderate retained stool throughout the colon.  No evidence of bowel   obstruction. Pelvis: Heterogeneous enlarged uterus again identified better characterized   on recent pelvic ultrasound.  This could be due to underlying   adenomyomatosis.  Mild bladder wall thickening. Peritoneum/Retroperitoneum: Left-sided periaortic lymph nodes likely reactive   subcentimeter in size mild thickening of the left mid ureter also likely   reactive. Bones/Soft Tissues: No suspicious osseous lesions.  Mild degenerate changes   L5-S1.                     CT HEAD WO CONTRAST (Final result)  Result time 12/21/20 17:18:53  Final result by Michelle Morris MD (12/21/20 17:18:53)                Impression:    No acute intracranial abnormality. Narrative:    EXAMINATION:   CT OF THE HEAD WITHOUT CONTRAST  12/21/2020 4:49 pm     TECHNIQUE:   CT of the head was performed without the administration of intravenous   contrast. Dose modulation, iterative reconstruction, and/or weight based   adjustment of the mA/kV was utilized to reduce the radiation dose to as low   as reasonably achievable. COMPARISON:   None. HISTORY:   ORDERING SYSTEM PROVIDED HISTORY: right sided headache; pain behind the right   eye   TECHNOLOGIST PROVIDED HISTORY:   right sided headache; pain behind the right eye     Is the patient pregnant?->No   Reason for Exam: headaches severe   Acuity: Acute   Type of Exam: Initial   Additional signs and symptoms: pt shivering, repeats for motion     FINDINGS:   Evaluation is somewhat limited by motion and streak artifact. BRAIN/VENTRICLES: There is no acute intracranial hemorrhage, mass effect or   midline shift.  No abnormal extra-axial fluid collection.  The gray-white   differentiation is maintained without evidence of an acute infarct.  There is   no evidence of hydrocephalus. ORBITS: The visualized portion of the orbits demonstrate no acute abnormality. SINUSES: The visualized paranasal sinuses and mastoid air cells demonstrate   no acute abnormality.      SOFT TISSUES/SKULL:  No acute abnormality of the visualized skull or soft   tissues.                     US GALLBLADDER RUQ (Preliminary result)  Result time 12/21/20 16:49:09 Preliminary result by Celso Donovan MD (12/21/20 16:49:09)                Impression:    Multiple gallbladder polyps and possible small calculi.  No acute features. Otherwise unremarkable right upper quadrant ultrasound. Narrative:    EXAMINATION:   RIGHT UPPER QUADRANT ULTRASOUND     12/21/2020 3:45 pm     COMPARISON:   CT 12/19/2020     HISTORY:   ORDERING SYSTEM PROVIDED HISTORY: abdominal pain; abnormal liver enzymes   TECHNOLOGIST PROVIDED HISTORY:   abdominal pain; abnormal liver enzymes     FINDINGS:   LIVER:  The liver demonstrates normal echogenicity without evidence of   intrahepatic biliary ductal dilatation. BILIARY SYSTEM:  Multiple nonshadowing small echogenic foci along the   gallbladder wall most consistent with small polyps.  Some of the foci may   represent small calculi.  There is no significant gallbladder wall thickening   or pericholecystic fluid.  Absent sonographic Garcia's sign. Common bile duct is within normal limits measuring 5 mm. RIGHT KIDNEY: The right kidney is grossly unremarkable without evidence of   hydronephrosis. PANCREAS:  Visualized portions of the pancreas are unremarkable. OTHER: No evidence of right upper quadrant ascites.                 Preliminary result by Celso Donovan MD (12/21/20 16:47:31)                Impression:    Multiple gallbladder polyps and possible small calculi.  No acute features. Otherwise unremarkable right upper quadrant ultrasound.                     US DUP ABD PEL RETRO SCROT LIMITED (Preliminary result)  Result time 12/21/20 16:43:48  Preliminary result by Celso Donovan MD (12/21/20 16:43:48)                Impression:    Nonvisualization of the left ovary.  There is normal Doppler flow in the   right ovary. Mildly enlarged heterogeneous uterus.  No discrete mass and the findings may   be related to underlying adenomyosis.  Otherwise unremarkable pelvic   ultrasound.              Narrative: EXAMINATION:   DOPPLER EVALUATION; PELVIC ULTRASOUND     12/21/2020     TECHNIQUE:   DOPPLER ULTRASOUND OF THE PELVIS; Transvaginal pelvic ultrasound was   performed. COMPARISON:   CT 12/19/2020     HISTORY:   ORDERING SYSTEM PROVIDED HISTORY: left pelvic pain; R/O torsion and abscess   TECHNOLOGIST PROVIDED HISTORY:   left pelvic pain; R/O torsion and abscess; ORDERING SYSTEM PROVIDED HISTORY:   left pelvic pain   TECHNOLOGIST PROVIDED HISTORY:   left pelvic pain     FINDINGS:     Measurements:     Uterus:  12.0 x 7.0 x 7.1 cm     Endometrial stripe:  11 mm     Right Ovary:  2.3 x 2.2 x 1.8 cm     Left Ovary:  Not visualized       Ultrasound Findings:     Uterus: Mildly enlarged heterogeneous uterus with no focal abnormality. Several nabothian cysts are noted in the lower uterine segment or cervix. Endometrial stripe: Endometrial stripe is within normal limits. Right Ovary: Right ovary is within normal limits.  There is normal arterial   and venous Doppler flow. Left Ovary:  No significant left adnexal mass. Free Fluid: No evidence of free fluid.                 Preliminary result by Tonya Manuel MD (12/21/20 16:41:13)                Impression:    Nonvisualization of the left ovary.  There is normal Doppler flow in the   right ovary. Mildly enlarged heterogeneous uterus.  No discrete mass and the findings may   be related to underlying adenomyosis.  Otherwise unremarkable pelvic   ultrasound.                     US NON OB TRANSVAGINAL (Preliminary result)  Result time 12/21/20 16:43:48  Preliminary result by Tonya Manuel MD (12/21/20 16:43:48)                Impression:    Nonvisualization of the left ovary.  There is normal Doppler flow in the   right ovary. Mildly enlarged heterogeneous uterus.  No discrete mass and the findings may   be related to underlying adenomyosis.  Otherwise unremarkable pelvic   ultrasound.              Narrative:    EXAMINATION: DOPPLER EVALUATION; PELVIC ULTRASOUND     12/21/2020     TECHNIQUE:   DOPPLER ULTRASOUND OF THE PELVIS; Transvaginal pelvic ultrasound was   performed. COMPARISON:   CT 12/19/2020     HISTORY:   ORDERING SYSTEM PROVIDED HISTORY: left pelvic pain; R/O torsion and abscess   TECHNOLOGIST PROVIDED HISTORY:   left pelvic pain; R/O torsion and abscess; ORDERING SYSTEM PROVIDED HISTORY:   left pelvic pain   TECHNOLOGIST PROVIDED HISTORY:   left pelvic pain     FINDINGS:     Measurements:     Uterus:  12.0 x 7.0 x 7.1 cm     Endometrial stripe:  11 mm     Right Ovary:  2.3 x 2.2 x 1.8 cm     Left Ovary:  Not visualized       Ultrasound Findings:     Uterus: Mildly enlarged heterogeneous uterus with no focal abnormality. Several nabothian cysts are noted in the lower uterine segment or cervix. Endometrial stripe: Endometrial stripe is within normal limits. Right Ovary: Right ovary is within normal limits.  There is normal arterial   and venous Doppler flow. Left Ovary:  No significant left adnexal mass. Free Fluid: No evidence of free fluid.                 Preliminary result by Misti Stuart MD (12/21/20 16:41:13)                Impression:    Nonvisualization of the left ovary.  There is normal Doppler flow in the   right ovary. Mildly enlarged heterogeneous uterus.  No discrete mass and the findings may   be related to underlying adenomyosis.  Otherwise unremarkable pelvic   ultrasound.                     US PELVIS COMPLETE (Preliminary result)  Result time 12/21/20 16:43:48  Preliminary result by Misti Stuart MD (12/21/20 16:43:48)                Impression:    Nonvisualization of the left ovary.  There is normal Doppler flow in the   right ovary. Mildly enlarged heterogeneous uterus.  No discrete mass and the findings may   be related to underlying adenomyosis.  Otherwise unremarkable pelvic   ultrasound.              Narrative:    EXAMINATION:   DOPPLER EVALUATION; PELVIC ULTRASOUND 12/21/2020     TECHNIQUE:   DOPPLER ULTRASOUND OF THE PELVIS; Transvaginal pelvic ultrasound was   performed. COMPARISON:   CT 12/19/2020     HISTORY:   ORDERING SYSTEM PROVIDED HISTORY: left pelvic pain; R/O torsion and abscess   TECHNOLOGIST PROVIDED HISTORY:   left pelvic pain; R/O torsion and abscess; ORDERING SYSTEM PROVIDED HISTORY:   left pelvic pain   TECHNOLOGIST PROVIDED HISTORY:   left pelvic pain     FINDINGS:     Measurements:     Uterus:  12.0 x 7.0 x 7.1 cm     Endometrial stripe:  11 mm     Right Ovary:  2.3 x 2.2 x 1.8 cm     Left Ovary:  Not visualized       Ultrasound Findings:     Uterus: Mildly enlarged heterogeneous uterus with no focal abnormality. Several nabothian cysts are noted in the lower uterine segment or cervix. Endometrial stripe: Endometrial stripe is within normal limits. Right Ovary: Right ovary is within normal limits.  There is normal arterial   and venous Doppler flow. Left Ovary:  No significant left adnexal mass. Free Fluid: No evidence of free fluid.                 Preliminary result by Ruchi Hoover MD (12/21/20 16:41:13)                Impression:    Nonvisualization of the left ovary.  There is normal Doppler flow in the   right ovary.      Mildly enlarged heterogeneous uterus.  No discrete mass and the findings may   be related to underlying adenomyosis.  Otherwise unremarkable pelvic   ultrasound.                   LABS:  Results for orders placed or performed during the hospital encounter of 12/21/20   CBC Auto Differential   Result Value Ref Range    WBC 3.6 3.5 - 11.0 k/uL    RBC 4.31 4.0 - 5.2 m/uL    Hemoglobin 12.9 12.0 - 16.0 g/dL    Hematocrit 38.4 36 - 46 %    MCV 89.2 80 - 100 fL    MCH 29.9 26 - 34 pg    MCHC 33.5 31 - 37 g/dL    RDW 12.0 (L) 12.5 - 15.4 %    Platelets 284 281 - 515 k/uL    MPV 7.3 6.0 - 12.0 fL    NRBC Automated NOT REPORTED per 100 WBC    Differential Type NOT REPORTED     Immature Granulocytes NOT REPORTED 0 % Absolute Immature Granulocyte NOT REPORTED 0.00 - 0.30 k/uL    WBC Morphology NOT REPORTED     RBC Morphology NOT REPORTED     Platelet Estimate NOT REPORTED     Seg Neutrophils 68 (H) 36 - 66 %    Lymphocytes 16 (L) 24 - 44 %    Monocytes 13 (H) 2 - 11 %    Eosinophils % 2 1 - 4 %    Basophils 1 0 - 2 %    Segs Absolute 2.50 1.8 - 7.7 k/uL    Absolute Lymph # 0.60 (L) 1.0 - 4.8 k/uL    Absolute Mono # 0.50 0.1 - 1.2 k/uL    Absolute Eos # 0.10 0.0 - 0.4 k/uL    Basophils Absolute 0.00 0.0 - 0.2 k/uL   Basic Metabolic Panel   Result Value Ref Range    Glucose 110 (H) 70 - 99 mg/dL    BUN 11 6 - 20 mg/dL    CREATININE 0.88 0.50 - 0.90 mg/dL    Bun/Cre Ratio NOT REPORTED 9 - 20    Calcium 8.7 8.6 - 10.4 mg/dL    Sodium 140 135 - 144 mmol/L    Potassium 3.8 3.7 - 5.3 mmol/L    Chloride 104 98 - 107 mmol/L    CO2 23 20 - 31 mmol/L    Anion Gap 13 9 - 17 mmol/L    GFR Non-African American >60 >60 mL/min    GFR African American >60 >60 mL/min    GFR Comment          GFR Staging NOT REPORTED    Urinalysis Reflex to Culture    Specimen: Urine, clean catch   Result Value Ref Range    Color, UA YELLOW YELLOW    Turbidity UA CLEAR CLEAR    Glucose, Ur NEGATIVE NEGATIVE    Bilirubin Urine NEGATIVE NEGATIVE    Ketones, Urine NEGATIVE NEGATIVE    Specific Gravity, UA 1.015 1.005 - 1.030    Urine Hgb NEGATIVE NEGATIVE    pH, UA 6.0 5.0 - 8.0    Protein, UA NEGATIVE NEGATIVE    Urobilinogen, Urine Normal Normal    Nitrite, Urine NEGATIVE NEGATIVE    Leukocyte Esterase, Urine NEGATIVE NEGATIVE    Urinalysis Comments       Microscopic exam not performed based on chemical results unless requested in original order.     Urinalysis Comments          Urinalysis Comments Utilizing a urinalysis as the only screening method to exclude a potential uropathogen can be unreliable in many patient populations. Rapid screening tests are less sensitive than culture and if UTI is a clinical possibility, culture should be considered despite a negative urinalysis. Hepatic Function Panel   Result Value Ref Range    Alb 3.4 (L) 3.5 - 5.2 g/dL    Alkaline Phosphatase 122 (H) 35 - 104 U/L    ALT 93 (H) 5 - 33 U/L    AST 57 (H) <32 U/L    Total Bilirubin 0.22 (L) 0.3 - 1.2 mg/dL    Bilirubin, Direct 0.10 <0.31 mg/dL    Bilirubin, Indirect 0.12 0.00 - 1.00 mg/dL    Total Protein 6.9 6.4 - 8.3 g/dL    Globulin NOT REPORTED 1.5 - 3.8 g/dL    Albumin/Globulin Ratio 1.0 1.0 - 2.5   Lipase   Result Value Ref Range    Lipase 13 13 - 60 U/L   Lactate, Sepsis   Result Value Ref Range    Lactic Acid, Sepsis 1.2 0.5 - 1.9 mmol/L    Lactic Acid, Sepsis, Whole Blood NOT REPORTED 0.5 - 1.9 mmol/L   COVID-19    Specimen: Other   Result Value Ref Range    SARS-CoV-2          SARS-CoV-2, Rapid Not Detected Not Detected    Source . NASOPHARYNGEAL SWAB     SARS-CoV-2         Pregnancy, Urine   Result Value Ref Range    HCG(Urine) Pregnancy Test NEGATIVE NEGATIVE       MDM: Patient returns to the ER for evaluation after being diagnosed with left pyelonephritis on December 19. Patient received IV Rocephin. She has been taking oral Keflex with no improvement. Patient is complaining of a headache. She has also had chills and sweats, but no documented fevers. The urine remains dark and at times contains blood. She also continues to have discomfort from the left flank down into the left lower quadrant and suprapubic region. I am concerned for worsening infection. Urinalysis was positive for infection at the last visit. No urine culture appears to have been ordered at the last visit. I will obtain IV access. Patient will be given a fluid bolus. I will check labs to include CBC with differential, basic metabolic panel, liver function, lipase, lactic acid, urine pregnancy, and urinalysis. Patient will be treated with IV Zofran and IV fentanyl.     EMERGENCY DEPARTMENT COURSE:   Vitals:    Vitals:    12/21/20 1536 12/21/20 1537 12/21/20 1638 12/21/20 1736   BP: 122/70  (!) 105/56 106/60   Pulse:  60 73 72   Resp:  15 15 16   Temp:       TempSrc:       SpO2:  100% 100% 100%   Weight:       Height:         -------------------------  BP: 106/60, Temp: 98.1 °F (36.7 °C), Pulse: 72, Resp: 16    The patient was given the following medications:  Orders Placed This Encounter   Medications    0.9 % sodium chloride bolus    ondansetron (ZOFRAN) injection 4 mg    fentaNYL (SUBLIMAZE) injection 25 mcg    0.9 % sodium chloride bolus    sodium chloride flush 0.9 % injection 10 mL    iopamidol (ISOVUE-370) 76 % injection 75 mL    0.9 % sodium chloride bolus    DISCONTD: ketorolac (TORADOL) injection 30 mg    cefTRIAXone (ROCEPHIN) 1 g IVPB in 50 mL D5W minibag     Order Specific Question:   Antimicrobial Indications     Answer:   Urinary Tract Infection    fentaNYL (SUBLIMAZE) injection 25 mcg    diphenhydrAMINE (BENADRYL) injection 25 mg    ondansetron (ZOFRAN) injection 4 mg  ketorolac (TORADOL) injection 30 mg      Re-evaluation Notes  3:30 pm.  Results of the labs were discussed with the patient by myself. Urinalysis has improved. Patient has a normal white blood cell count. She does have abnormal liver results. She remains tender on the left side. Fentanyl has helped slightly with her pain. She rates her acute pain at 4/10.    4:29 pm.  Patient with continued headache pain. Pain is now 10/10. We will obtain a CT scan of the head. 5:31 pm.  Results of the remaining imaging studies were discussed with the patient by Dr. Natalia Castorena. Headache pain has improved some with the IV fentanyl. Patient was offered a lumbar puncture to further evaluate her headache pain, but she has declined. Headache pain does not appear to be consistent with meningitis given the pain is behind the right eye. Patient is agreeable to admission. CT scan of the abdomen and pelvis shows continued left pyelonephritis. 5:50 pm.  Patient was discussed with Crystal Wells CNP. Patient has been accepted for admission. A urine culture has been added to the orders. FINAL IMPRESSION      1. Pyelonephritis of left kidney    2. Right-sided headache        DISPOSITION/PLAN   DISPOSITION - admit to [address] [place] [address] on Disposition  Stable    PATIENT REFERRED TO:  No follow-up provider specified.     DISCHARGE MEDICATIONS:  Current Discharge Medication List        (Please note that portions of this note were completed with a voice recognition program.  Efforts were made to edit the dictations but occasionally words are mis-transcribed.)    Efren Kramer PA-C  12/21/20 8462

## 2020-12-21 NOTE — ED NOTES
Dr Ledezma Adams at bedside speaking with patient and family member.       Chantell Morrison RN  12/21/20 2278

## 2020-12-21 NOTE — ED NOTES
Gurpreet Ward MD at bedside updating pt on results and plan of care.          Shadia Bradley RN  12/21/20 0778

## 2020-12-21 NOTE — ED NOTES
Pt arrives to ED with c/o abdominal pain and headache. She states that she was evaluated and treated in the ED on Saturday. She was been on multiple antibiotics for an UTI and was dx with pyelonephritis. Pt states that she has felt nauseated, along with loss of appetite. Pt c/o headache. Pt resting in bed, comfort offered, no concerns, no s/s of distress.       Benigno Pereyra RN  12/21/20 5820

## 2020-12-21 NOTE — ED NOTES
Pt ambulates to bathroom with steady gait. Pt denies the need for assistance.       Benigno Pereyra RN  12/21/20 2062

## 2020-12-21 NOTE — ED PROVIDER NOTES
[id number] [facility]  [id number] [address] [facility] [address] [facility] [address]  Phone: [phone]  Fax: [phone]      Attending Physician Attestation    I performed a history and physical examination of the patient and discussed management with the mid level provider. I reviewed the mid level provider's note and agree with the documented findings and plan of care. Any areas of disagreement are noted on the chart. I was personally present for the key portions of any procedures. I have documented in the chart those procedures where I was not present during the key portions. I have reviewed the emergency nurses triage note. I agree with the chief complaint, past medical history, past surgical history, allergies, medications, social and family history as documented unless otherwise noted below. Documentation of the HPI, Physical Exam and Medical Decision Making performed by mid level providers is based on my personal performance of the HPI, PE and MDM. For Physician Assistant/ Nurse Practitioner cases/documentation I have personally evaluated this patient and have completed at least one if not all key elements of the E/M (history, physical exam, and MDM). Additional findings are as noted. CHIEF COMPLAINT       Chief Complaint   Patient presents with    Abdominal Pain     pt was here on saturday for abdominal pain and it is no better    Headache    Rash         HISTORY OF PRESENT ILLNESS    Glenford Romberg is a 54 y.o. female who presents with abdominal pain headache. Patient previously was diagnosed with pyelonephritis was given IV Rocephin placed on Keflex patient states through the weekend she has been taken the Keflex but feels no better she has a right-sided headache of which she has a history of migraines she is photosensitive she has abdominal pain flank pain she has had subjective fever and chills      PAST MEDICAL HISTORY    has no past medical history on file.     SURGICAL HISTORY has no past surgical history on file. CURRENT MEDICATIONS       Previous Medications    CEPHALEXIN (KEFLEX) 500 MG CAPSULE    Take 1 capsule by mouth 3 times daily    ESTROGENS, CONJUGATED, (PREMARIN) 0.3 MG TABLET    Take 0.3 mg by mouth daily    HYDROCODONE-ACETAMINOPHEN (NORCO) 5-325 MG PER TABLET    Take 1 tablet by mouth every 6 hours as needed for Pain for up to 4 days. ONDANSETRON (ZOFRAN) 4 MG TABLET    Take 1 tablet by mouth every 6 hours as needed for Nausea or Vomiting    POLYETHYLENE GLYCOL (MIRALAX) 17 GM/SCOOP POWDER    Take 17 g by mouth daily PRN constipation    PROGESTERONE (PROMETRIUM) 200 MG CAPSULE    Take 400 mg by mouth daily       ALLERGIES     is allergic to aspirin and pcn [penicillins]. FAMILY HISTORY     has no family status information on file. family history is not on file. SOCIAL HISTORY      reports that she has never smoked. She has never used smokeless tobacco. She reports current alcohol use. She reports that she does not use drugs. PHYSICAL EXAM     INITIAL VITALS:  height is 5' 9\" (1.753 m) and weight is 63.5 kg (140 lb). Her oral temperature is 98.1 °F (36.7 °C). Her blood pressure is 123/73 and her pulse is 75. Her respiration is 14 and oxygen saturation is 100%.       The head is normocephalic pupils equal round reactive to light  The neck is supple trachea midline no adenopathy no meningeal signs  Cardiac S1-S2 with a regular rate and rhythm  Pulmonary is clear to auscultation bilateral  Abdomen is soft with some tenderness palpation the left lateral abdomen left CVA region no right-sided abdominal tenderness  Extremities are warm and dry with good pulses motor sensation throughout  Neurologic GCS is 15 and no focal deficits are appreciated      DIAGNOSTIC RESULTS         RADIOLOGY: Non-plain film images such as CT, Ultrasound and MRI are read by the radiologist. [facility] radiographic images are visualized and the radiologist interpretations are reviewed as follows:       Ct Head Wo Contrast    Result Date: 12/21/2020  EXAMINATION: CT OF THE HEAD WITHOUT CONTRAST  12/21/2020 4:49 pm TECHNIQUE: CT of the head was performed without the administration of intravenous contrast. Dose modulation, iterative reconstruction, and/or weight based adjustment of the mA/kV was utilized to reduce the radiation dose to as low as reasonably achievable. COMPARISON: None. HISTORY: ORDERING SYSTEM PROVIDED HISTORY: right sided headache; pain behind the right eye TECHNOLOGIST PROVIDED HISTORY: right sided headache; pain behind the right eye Is the patient pregnant?->No Reason for Exam: headaches severe Acuity: Acute Type of Exam: Initial Additional signs and symptoms: pt shivering, repeats for motion FINDINGS: Evaluation is somewhat limited by motion and streak artifact. BRAIN/VENTRICLES: There is no acute intracranial hemorrhage, mass effect or midline shift. No abnormal extra-axial fluid collection. The gray-white differentiation is maintained without evidence of an acute infarct. There is no evidence of hydrocephalus. ORBITS: The visualized portion of the orbits demonstrate no acute abnormality. SINUSES: The visualized paranasal sinuses and mastoid air cells demonstrate no acute abnormality. SOFT TISSUES/SKULL:  No acute abnormality of the visualized skull or soft tissues. No acute intracranial abnormality.      Us Non Ob Transvaginal    Result Date: 12/21/2020 EXAMINATION: DOPPLER EVALUATION; PELVIC ULTRASOUND 12/21/2020 TECHNIQUE: DOPPLER ULTRASOUND OF THE PELVIS; Transvaginal pelvic ultrasound was performed. COMPARISON: CT 12/19/2020 HISTORY: ORDERING SYSTEM PROVIDED HISTORY: left pelvic pain; R/O torsion and abscess TECHNOLOGIST PROVIDED HISTORY: left pelvic pain; R/O torsion and abscess; ORDERING SYSTEM PROVIDED HISTORY: left pelvic pain TECHNOLOGIST PROVIDED HISTORY: left pelvic pain FINDINGS: Measurements: Uterus:  12.0 x 7.0 x 7.1 cm Endometrial stripe:  11 mm Right Ovary:  2.3 x 2.2 x 1.8 cm Left Ovary:  Not visualized Ultrasound Findings: Uterus: Mildly enlarged heterogeneous uterus with no focal abnormality. Several nabothian cysts are noted in the lower uterine segment or cervix. Endometrial stripe: Endometrial stripe is within normal limits. Right Ovary: Right ovary is within normal limits. There is normal arterial and venous Doppler flow. Left Ovary:  No significant left adnexal mass. Free Fluid: No evidence of free fluid. Nonvisualization of the left ovary. There is normal Doppler flow in the right ovary. Mildly enlarged heterogeneous uterus. No discrete mass and the findings may be related to underlying adenomyosis. Otherwise unremarkable pelvic ultrasound.      Us Pelvis Complete    Result Date: 12/21/2020 EXAMINATION: CT OF THE ABDOMEN AND PELVIS WITH CONTRAST 12/21/2020 3:50 pm TECHNIQUE: CT of the abdomen and pelvis was performed with the administration of intravenous contrast. Multiplanar reformatted images are provided for review. Dose modulation, iterative reconstruction, and/or weight based adjustment of the mA/kV was utilized to reduce the radiation dose to as low as reasonably achievable. COMPARISON: Pelvic ultrasound 12/21/2020, CT abdomen pelvis 12/19/2022 HISTORY: ORDERING SYSTEM PROVIDED HISTORY: left sided flank and abdominal pain TECHNOLOGIST PROVIDED HISTORY: left sided flank and abdominal pain Reason for Exam: left side abdominal and flank pain, pt seen 2 days ago in ER Acuity: Acute Type of Exam: Subsequent/Follow-up FINDINGS: Lower Chest: Lung bases are clear. Small hiatal hernia. Organs: Scattered hepatic hypodensities not significant changed. Largest 1.5 cm. Additional smaller lesions too small to be accurately characterized but likely cysts or hemangiomas require no further follow-up or workup. Subcentimeter renal lesions too small to be accurately characterize favor cyst.  Heterogeneous enhancement of the left kidney most pronounced involving left lower pole has progressed consistent with underlying pyelonephritis. No loculated abscess identified. Mild right haziness of the left renal collecting system and proximal ureter. Left renal vein is unremarkable in opacification. GI/Bowel: Moderate retained stool throughout the colon. No evidence of bowel obstruction. Pelvis: Heterogeneous enlarged uterus again identified better characterized on recent pelvic ultrasound. This could be due to underlying adenomyomatosis. Mild bladder wall thickening. Peritoneum/Retroperitoneum: Left-sided periaortic lymph nodes likely reactive subcentimeter in size mild thickening of the left mid ureter also likely reactive. Bones/Soft Tissues: No suspicious osseous lesions. Mild degenerate changes L5-S1. Left-sided pyelonephritis and mild thickening of the left ureter and bladder likely secondary findings. No loculated perinephric fluid collections. Moderate retained stool throughout colon. Heterogeneous enlarged uterus again identified better evaluated on recent pelvic ultrasound.      Ct Abdomen Pelvis W Iv Contrast Additional Contrast? None    Result Date: 12/19/2020 EXAMINATION: CT OF THE ABDOMEN AND PELVIS WITH CONTRAST 12/19/2020 11:45 am TECHNIQUE: CT of the abdomen and pelvis was performed with the administration of intravenous contrast. Multiplanar reformatted images are provided for review. Dose modulation, iterative reconstruction, and/or weight based adjustment of the mA/kV was utilized to reduce the radiation dose to as low as reasonably achievable. COMPARISON: None HISTORY: ORDERING SYSTEM PROVIDED HISTORY: abd pain TECHNOLOGIST PROVIDED HISTORY: abd pain Reason for Exam: frequent urination Acuity: Acute Type of Exam: Initial Additional signs and symptoms: left flank pain Relevant Medical/Surgical History: pt denies FINDINGS: LOWER CHEST:  Minimal gravity dependent atelectasis in the bilateral lower lobes. Normal heart size. No pleural nor pericardial effusions. ORGANS:  1.5 cm cyst near the margin of hepatic segments 4a and 8 in the dome. Additional hypodense liver lesions measuring up to 0.5 cm, too small to characterize but likely cysts or hemangiomas. Mild pancreatic atrophy. Homogeneously hypodense lesions in the right kidney measuring up to 0.8 cm, too small to characterize but likely cysts (Bosniak category 2). Heterogeneous enhancement of the left kidney with asymmetric left perinephric stranding. No calculi nor hydroureteronephrosis. Urothelial thickening and asymmetric stranding associated with the left ureter. Normal gallbladder, spleen, and adrenal glands. GI/BOWEL:  Normal course and caliber of the stomach, small bowel, colon, and rectum without obstruction. No visualization of the appendix if present. Mild stool. PELVIS:  Enlarged uterus for age with heterogeneous myometrium especially posteriorly. Endometrial thickening for age, measuring at least 0.8 cm. 1.5 cm simple right ovarian cystic lesion. Normal left ovary. Up to mild urinary bladder wall thickening with subtle perivesical stranding.   Suspected laxity of the pelvic floor EXAMINATION: DOPPLER EVALUATION; PELVIC ULTRASOUND 12/21/2020 TECHNIQUE: DOPPLER ULTRASOUND OF THE PELVIS; Transvaginal pelvic ultrasound was performed. COMPARISON: CT 12/19/2020 HISTORY: ORDERING SYSTEM PROVIDED HISTORY: left pelvic pain; R/O torsion and abscess TECHNOLOGIST PROVIDED HISTORY: left pelvic pain; R/O torsion and abscess; ORDERING SYSTEM PROVIDED HISTORY: left pelvic pain TECHNOLOGIST PROVIDED HISTORY: left pelvic pain FINDINGS: Measurements: Uterus:  12.0 x 7.0 x 7.1 cm Endometrial stripe:  11 mm Right Ovary:  2.3 x 2.2 x 1.8 cm Left Ovary:  Not visualized Ultrasound Findings: Uterus: Mildly enlarged heterogeneous uterus with no focal abnormality. Several nabothian cysts are noted in the lower uterine segment or cervix. Endometrial stripe: Endometrial stripe is within normal limits. Right Ovary: Right ovary is within normal limits. There is normal arterial and venous Doppler flow. Left Ovary:  No significant left adnexal mass. Free Fluid: No evidence of free fluid. Nonvisualization of the left ovary. There is normal Doppler flow in the right ovary. Mildly enlarged heterogeneous uterus. No discrete mass and the findings may be related to underlying adenomyosis. Otherwise unremarkable pelvic ultrasound.        LABS:  Results for orders placed or performed during the hospital encounter of 12/21/20   CBC Auto Differential   Result Value Ref Range    WBC 3.6 3.5 - 11.0 k/uL    RBC 4.31 4.0 - 5.2 m/uL    Hemoglobin 12.9 12.0 - 16.0 g/dL    Hematocrit 38.4 36 - 46 %    MCV 89.2 80 - 100 fL    MCH 29.9 26 - 34 pg    MCHC 33.5 31 - 37 g/dL    RDW 12.0 (L) 12.5 - 15.4 %    Platelets 595 876 - 326 k/uL    MPV 7.3 6.0 - 12.0 fL    NRBC Automated NOT REPORTED per 100 WBC    Differential Type NOT REPORTED     Immature Granulocytes NOT REPORTED 0 %    Absolute Immature Granulocyte NOT REPORTED 0.00 - 0.30 k/uL    WBC Morphology NOT REPORTED     RBC Morphology NOT REPORTED     Platelet Estimate NOT REPORTED Seg Neutrophils 68 (H) 36 - 66 %    Lymphocytes 16 (L) 24 - 44 %    Monocytes 13 (H) 2 - 11 %    Eosinophils % 2 1 - 4 %    Basophils 1 0 - 2 %    Segs Absolute 2.50 1.8 - 7.7 k/uL    Absolute Lymph # 0.60 (L) 1.0 - 4.8 k/uL    Absolute Mono # 0.50 0.1 - 1.2 k/uL    Absolute Eos # 0.10 0.0 - 0.4 k/uL    Basophils Absolute 0.00 0.0 - 0.2 k/uL   Basic Metabolic Panel   Result Value Ref Range    Glucose 110 (H) 70 - 99 mg/dL    BUN 11 6 - 20 mg/dL    CREATININE 0.88 0.50 - 0.90 mg/dL    Bun/Cre Ratio NOT REPORTED 9 - 20    Calcium 8.7 8.6 - 10.4 mg/dL    Sodium 140 135 - 144 mmol/L    Potassium 3.8 3.7 - 5.3 mmol/L    Chloride 104 98 - 107 mmol/L    CO2 23 20 - 31 mmol/L    Anion Gap 13 9 - 17 mmol/L    GFR Non-African American >60 >60 mL/min    GFR African American >60 >60 mL/min    GFR Comment          GFR Staging NOT REPORTED    Urinalysis Reflex to Culture    Specimen: Urine, clean catch   Result Value Ref Range    Color, UA YELLOW YELLOW    Turbidity UA CLEAR CLEAR    Glucose, Ur NEGATIVE NEGATIVE    Bilirubin Urine NEGATIVE NEGATIVE    Ketones, Urine NEGATIVE NEGATIVE    Specific Gravity, UA 1.015 1.005 - 1.030    Urine Hgb NEGATIVE NEGATIVE    pH, UA 6.0 5.0 - 8.0    Protein, UA NEGATIVE NEGATIVE    Urobilinogen, Urine Normal Normal    Nitrite, Urine NEGATIVE NEGATIVE    Leukocyte Esterase, Urine NEGATIVE NEGATIVE    Urinalysis Comments       Microscopic exam not performed based on chemical results unless requested in original order. Urinalysis Comments          Urinalysis Comments       Utilizing a urinalysis as the only screening method to exclude a potential uropathogen can be unreliable in many patient populations. Rapid screening tests are less sensitive than culture and if UTI is a clinical possibility, culture should be considered despite a negative urinalysis.    Hepatic Function Panel   Result Value Ref Range    Alb 3.4 (L) 3.5 - 5.2 g/dL    Alkaline Phosphatase 122 (H) 35 - 104 U/L ALT 93 (H) 5 - 33 U/L    AST 57 (H) <32 U/L    Total Bilirubin 0.22 (L) 0.3 - 1.2 mg/dL    Bilirubin, Direct PENDING mg/dL    Bilirubin, Indirect PENDING mg/dL    Total Protein 6.9 6.4 - 8.3 g/dL    Globulin NOT REPORTED 1.5 - 3.8 g/dL    Albumin/Globulin Ratio 1.0 1.0 - 2.5   Lipase   Result Value Ref Range    Lipase 13 13 - 60 U/L   Lactate, Sepsis   Result Value Ref Range    Lactic Acid, Sepsis 1.2 0.5 - 1.9 mmol/L    Lactic Acid, Sepsis, Whole Blood NOT REPORTED 0.5 - 1.9 mmol/L   COVID-19    Specimen: Other   Result Value Ref Range    SARS-CoV-2          SARS-CoV-2, Rapid Not Detected Not Detected    Source . NASOPHARYNGEAL SWAB     SARS-CoV-2         Pregnancy, Urine   Result Value Ref Range    HCG(Urine) Pregnancy Test NEGATIVE NEGATIVE           EMERGENCY DEPARTMENT COURSE:   Vitals:    Vitals:    12/21/20 1420   BP: 123/73   Pulse: 75   Resp: 14   Temp: 98.1 °F (36.7 °C)   TempSrc: Oral   SpO2: 100%   Weight: 63.5 kg (140 lb)   Height: 5' 9\" (1.753 m)     -------------------------  BP: 123/73, Temp: 98.1 °F (36.7 °C), Pulse: 75, Resp: 14      PERTINENT ATTENDING PHYSICIAN COMMENTS:    CT shows what appears to be a left-sided pyelonephritis the patient remains in discomfort has guarding and rebound of the abdomen given this I do feel that she warrants admission to the hospital for further work-up and treatment because of the patient's headache I did discuss the risks and benefits of a lumbar puncture at this point she is deferring the lumbar puncture CT of the head showed no acute process and she has no overt meningeal sign she is able to flex extend and rotate her neck without any any increase in pain the pain is behind the right eye suggestive of this being a cluster headache or migraine headache nonetheless we have given her IV antibiotics and I do feel she warrants admission to the hospital for further treatment so we will contact our hospitalist for admission (Please note that portions of this note were completed with a voice recognition program.  Efforts were made to edit the dictations but occasionally words are mis-transcribed.)    El MD, F.A.C.E.P.   Attending Emergency Medicine Physician       Muna Galo MD  12/21/20 [id number]

## 2020-12-22 PROBLEM — K59.00 CONSTIPATION: Status: ACTIVE | Noted: 2020-12-22

## 2020-12-22 PROBLEM — L29.9 GENERALIZED PRURITUS: Status: ACTIVE | Noted: 2020-12-22

## 2020-12-22 PROBLEM — G43.909 MIGRAINE: Status: ACTIVE | Noted: 2020-12-22

## 2020-12-22 PROBLEM — R74.01 TRANSAMINITIS: Status: ACTIVE | Noted: 2020-12-22

## 2020-12-22 PROBLEM — R21 RASH IN ADULT: Status: ACTIVE | Noted: 2020-12-22

## 2020-12-22 LAB
ANION GAP SERPL CALCULATED.3IONS-SCNC: 12 MMOL/L (ref 9–17)
BUN BLDV-MCNC: 9 MG/DL (ref 6–20)
BUN/CREAT BLD: ABNORMAL (ref 9–20)
CALCIUM SERPL-MCNC: 8.2 MG/DL (ref 8.6–10.4)
CHLORIDE BLD-SCNC: 107 MMOL/L (ref 98–107)
CO2: 22 MMOL/L (ref 20–31)
CREAT SERPL-MCNC: 0.77 MG/DL (ref 0.5–0.9)
CULTURE: NO GROWTH
GFR AFRICAN AMERICAN: >60 ML/MIN
GFR NON-AFRICAN AMERICAN: >60 ML/MIN
GFR SERPL CREATININE-BSD FRML MDRD: ABNORMAL ML/MIN/{1.73_M2}
GFR SERPL CREATININE-BSD FRML MDRD: ABNORMAL ML/MIN/{1.73_M2}
GLUCOSE BLD-MCNC: 110 MG/DL (ref 70–99)
HCT VFR BLD CALC: 35 % (ref 36–46)
HEMOGLOBIN: 11.9 G/DL (ref 12–16)
INR BLD: 1
Lab: NORMAL
MCH RBC QN AUTO: 29.7 PG (ref 26–34)
MCHC RBC AUTO-ENTMCNC: 33.9 G/DL (ref 31–37)
MCV RBC AUTO: 87.6 FL (ref 80–100)
NRBC AUTOMATED: ABNORMAL PER 100 WBC
PDW BLD-RTO: 11.9 % (ref 12.5–15.4)
PLATELET # BLD: 201 K/UL (ref 140–450)
PMV BLD AUTO: 7.6 FL (ref 6–12)
POTASSIUM SERPL-SCNC: 3.8 MMOL/L (ref 3.7–5.3)
PROTHROMBIN TIME: 10.6 SEC (ref 9.4–12.6)
RBC # BLD: 4 M/UL (ref 4–5.2)
SODIUM BLD-SCNC: 141 MMOL/L (ref 135–144)
SPECIMEN DESCRIPTION: NORMAL
WBC # BLD: 3.4 K/UL (ref 3.5–11)

## 2020-12-22 PROCEDURE — 85610 PROTHROMBIN TIME: CPT

## 2020-12-22 PROCEDURE — 99222 1ST HOSP IP/OBS MODERATE 55: CPT | Performed by: NURSE PRACTITIONER

## 2020-12-22 PROCEDURE — 6360000002 HC RX W HCPCS: Performed by: NURSE PRACTITIONER

## 2020-12-22 PROCEDURE — 1210000000 HC MED SURG R&B

## 2020-12-22 PROCEDURE — 80048 BASIC METABOLIC PNL TOTAL CA: CPT

## 2020-12-22 PROCEDURE — 36415 COLL VENOUS BLD VENIPUNCTURE: CPT

## 2020-12-22 PROCEDURE — 85027 COMPLETE CBC AUTOMATED: CPT

## 2020-12-22 PROCEDURE — 2580000003 HC RX 258: Performed by: NURSE PRACTITIONER

## 2020-12-22 PROCEDURE — 6370000000 HC RX 637 (ALT 250 FOR IP): Performed by: NURSE PRACTITIONER

## 2020-12-22 RX ORDER — POLYETHYLENE GLYCOL 3350 17 G/17G
17 POWDER, FOR SOLUTION ORAL 2 TIMES DAILY
Status: DISCONTINUED | OUTPATIENT
Start: 2020-12-22 | End: 2020-12-23 | Stop reason: HOSPADM

## 2020-12-22 RX ORDER — DIPHENHYDRAMINE HCL 25 MG
25 TABLET ORAL EVERY 6 HOURS PRN
Status: DISCONTINUED | OUTPATIENT
Start: 2020-12-22 | End: 2020-12-23 | Stop reason: HOSPADM

## 2020-12-22 RX ORDER — TRAMADOL HYDROCHLORIDE 50 MG/1
50 TABLET ORAL EVERY 6 HOURS PRN
Status: DISCONTINUED | OUTPATIENT
Start: 2020-12-22 | End: 2020-12-23 | Stop reason: HOSPADM

## 2020-12-22 RX ORDER — BUTALBITAL, ACETAMINOPHEN AND CAFFEINE 50; 325; 40 MG/1; MG/1; MG/1
1 TABLET ORAL EVERY 4 HOURS PRN
Status: DISCONTINUED | OUTPATIENT
Start: 2020-12-22 | End: 2020-12-23 | Stop reason: HOSPADM

## 2020-12-22 RX ADMIN — POLYETHYLENE GLYCOL 3350 17 G: 17 POWDER, FOR SOLUTION ORAL at 08:25

## 2020-12-22 RX ADMIN — SODIUM CHLORIDE: 9 INJECTION, SOLUTION INTRAVENOUS at 17:32

## 2020-12-22 RX ADMIN — Medication 10 ML: at 08:24

## 2020-12-22 RX ADMIN — ONDANSETRON 4 MG: 2 INJECTION INTRAMUSCULAR; INTRAVENOUS at 19:44

## 2020-12-22 RX ADMIN — DIPHENHYDRAMINE HCL 25 MG: 25 TABLET ORAL at 21:36

## 2020-12-22 RX ADMIN — ACETAMINOPHEN 650 MG: 325 TABLET ORAL at 02:46

## 2020-12-22 RX ADMIN — ENOXAPARIN SODIUM 40 MG: 40 INJECTION SUBCUTANEOUS at 08:24

## 2020-12-22 RX ADMIN — PROGESTERONE 400 MG: 100 CAPSULE ORAL at 12:12

## 2020-12-22 RX ADMIN — BUTALBITAL, ACETAMINOPHEN AND CAFFEINE 1 TABLET: 50; 325; 40 TABLET ORAL at 21:36

## 2020-12-22 RX ADMIN — PIPERACILLIN AND TAZOBACTAM 3.38 G: 3; .375 INJECTION, POWDER, LYOPHILIZED, FOR SOLUTION INTRAVENOUS at 11:18

## 2020-12-22 RX ADMIN — Medication 10 ML: at 20:00

## 2020-12-22 RX ADMIN — ACETAMINOPHEN 650 MG: 325 TABLET ORAL at 19:44

## 2020-12-22 RX ADMIN — PIPERACILLIN AND TAZOBACTAM 3.38 G: 3; .375 INJECTION, POWDER, LYOPHILIZED, FOR SOLUTION INTRAVENOUS at 02:46

## 2020-12-22 RX ADMIN — PIPERACILLIN AND TAZOBACTAM 3.38 G: 3; .375 INJECTION, POWDER, LYOPHILIZED, FOR SOLUTION INTRAVENOUS at 19:44

## 2020-12-22 RX ADMIN — ONDANSETRON 4 MG: 2 INJECTION INTRAMUSCULAR; INTRAVENOUS at 06:00

## 2020-12-22 RX ADMIN — BUTALBITAL, ACETAMINOPHEN AND CAFFEINE 1 TABLET: 50; 325; 40 TABLET ORAL at 09:06

## 2020-12-22 RX ADMIN — TRAMADOL HYDROCHLORIDE 50 MG: 50 TABLET, FILM COATED ORAL at 06:00

## 2020-12-22 ASSESSMENT — PAIN SCALES - GENERAL
PAINLEVEL_OUTOF10: 8
PAINLEVEL_OUTOF10: 3
PAINLEVEL_OUTOF10: 7
PAINLEVEL_OUTOF10: 0
PAINLEVEL_OUTOF10: 3
PAINLEVEL_OUTOF10: 3
PAINLEVEL_OUTOF10: 4
PAINLEVEL_OUTOF10: 0
PAINLEVEL_OUTOF10: 2
PAINLEVEL_OUTOF10: 10
PAINLEVEL_OUTOF10: 3
PAINLEVEL_OUTOF10: 0
PAINLEVEL_OUTOF10: 3

## 2020-12-22 ASSESSMENT — ENCOUNTER SYMPTOMS
RESPIRATORY NEGATIVE: 1
EYES NEGATIVE: 1
NAUSEA: 1
VOMITING: 1
CONSTIPATION: 1
ROS SKIN COMMENTS: GENERALIZED
ABDOMINAL PAIN: 0
COLOR CHANGE: 0
BACK PAIN: 1

## 2020-12-22 ASSESSMENT — PAIN DESCRIPTION - DESCRIPTORS
DESCRIPTORS: ACHING
DESCRIPTORS: ACHING

## 2020-12-22 ASSESSMENT — PAIN DESCRIPTION - FREQUENCY
FREQUENCY: CONTINUOUS
FREQUENCY: CONTINUOUS

## 2020-12-22 ASSESSMENT — PAIN DESCRIPTION - LOCATION
LOCATION: HEAD
LOCATION: HEAD

## 2020-12-22 ASSESSMENT — PAIN DESCRIPTION - PAIN TYPE
TYPE: ACUTE PAIN
TYPE: ACUTE PAIN

## 2020-12-22 ASSESSMENT — PAIN - FUNCTIONAL ASSESSMENT
PAIN_FUNCTIONAL_ASSESSMENT: PREVENTS OR INTERFERES SOME ACTIVE ACTIVITIES AND ADLS
PAIN_FUNCTIONAL_ASSESSMENT: PREVENTS OR INTERFERES SOME ACTIVE ACTIVITIES AND ADLS

## 2020-12-22 ASSESSMENT — PAIN SCALES - WONG BAKER: WONGBAKER_NUMERICALRESPONSE: 0

## 2020-12-22 NOTE — CARE COORDINATION
Case Management Initial Discharge Plan  Wiliam Willard,             Met with:patient to discuss discharge plans. Information verified: address, contacts, phone number, , insurance Yes- phone number updated in chart    Emergency Contact/Next of Kin name & number: s/o Scott Bergman, [phone]    PCP: No primary care provider on file. Date of last visit:     Insurance Provider: none - HELP notified and will contact     Discharge Planning    Living Arrangements:  Spouse/Significant Other   Support Systems:  Spouse/Significant Other    Patient able to perform ADL's:Independent    Current Services (outpatient & in home) none    Receiving oral anticoagulation therapy? No      Potential Assistance Needed:  N/A    Patient agreeable to home care: No      Prior SNF/Rehab Placement and Facility: no  Agreeable to SNF/Rehab: No       Evaluation: no    Expected Discharge date:  20    Patient expects to be discharged to:  home  Follow Up Appointment: Best Day/ Time: Monday AM    Transportation provider: self  Transportation arrangements needed for discharge: No    Readmission Risk              Risk of Unplanned Readmission:        10         Does patient have a readmission risk score greater than 14?: No  If yes, follow-up appointment must be made within 7 days of discharge.      Goals of Care: manage infection      Discharge Plan: home with s/o          Electronically signed by Jhonathan Haley RN on 20 at 4:05 PM EST

## 2020-12-22 NOTE — PLAN OF CARE
Problem: Pain:  Goal: Pain level will decrease  Description: Pain level will decrease  Outcome: Ongoing   C/o of migraine and abdominal pain. Pain level assessment complete.    Patient educated on pain scale and control interventions  PRN pain medication given per patient request  Patient instructed to call out with new onset of pain or unrelieved pain

## 2020-12-22 NOTE — PROGRESS NOTES
Patient requesting pain medication for migraine, abdomenal pain has subsided. Obi Eubanks NP ordered morphine 4mg one time. Patient is feeling relieved. Informed patient to alert nurse if pain increases again. Oriented to room, vital, and admission questions completed.    Zosyn and continuous fluid started  Significant other : Zak Molina [phone]

## 2020-12-22 NOTE — PROGRESS NOTES
Patient c/o migraine, tried wet towel and tylenol.    Pain rating 10/10 emotionally distressed  Delgrosso Np - ultram ordered

## 2020-12-22 NOTE — H&P
[facility]  Office: [address], DO, Samra Zimmerman, DO, Gerald Celis, DO, Italo Dean Blood, DO, Nannette Ruiz MD, Daja Wei MD, Rupal Olivares MD, Marcie Morris MD, Melisa Goldmann, MD, Estefany Garcia MD, Nicola Leonard MD, Bear Farmer MD, Emily Prabhakar MD, Caden Castillo, DO, Jamison Corrigan MD, Arti Grewal MD, Venkata Mojica DO, Mundo Ibarra MD,  Catherine Strong DO, Codi Dewitt MD, Mela Catalan MD, Porfirio Garcia [facility], [facility], CNP, Cesar Hill, CNP, Aryan Grider, CNS, Meryl Florian, CNP, Amber Hidalgo, CNP, Marlin Davies, CNP, Laisha Ambrosio, CNP, Chepe Vazquez, CNP, Jansi Bee PA-C, Rashawn Ross, [facility], Suzy Vincent, CNP, Maria Eugenia Sotelo, CNP, Tamra Joseph, CNP, Marium Santillan, CNP, Winston Gamboa, CNP         [facility]   [address] [facility]    HISTORY AND PHYSICAL EXAMINATION            Date:   12/22/2020  Patient name:  Dipesh Escobar  Date of admission:  12/21/2020  2:15 PM  MRN:   [id number]  Account:  [de-identified]  YOB: 1965  PCP:    No primary care provider on file. Room:   [address] [place] [address]  Code Status:    Full Code    Chief Complaint:     Chief Complaint   Patient presents with    Abdominal Pain     pt was here on saturday for abdominal pain and it is no better    Headache    Rash       History Obtained From:     patient, spouse, electronic medical record    History of Present Illness:     Dipesh Escobar is a 54 y.o. Non-/non  female who presents with Abdominal Pain (pt was here on saturday for abdominal pain and it is no better), Headache, and Rash   and is admitted to the hospital for the management of Pyelonephritis. Patient reports her symptoms started about a week ago with migraine, chills, fevers, rash. She had a telemedicine visit and was started on Cipro. She reports the rash started after taking Cipro. On Saturday she was seen in the emergency department because she did not feel any better. She was given Keflex at that time and discharged. Since Saturday, she has had increased back pain and throwing up and nausea and she returned to the ED for further evaluation. She says recently over the last couple years she has had 2-3 UTIs per year, and she never had issue with UTIs in the past.  Other significant medical history is migraine. While in the ED, CT the abdomen pelvis with IV contrast was completed and revealed a left-sided pyelonephritis and mild thickening of the left ureter and bladder. Heterogeneous enlarged uterus was also seen. Right upper quadrant ultrasound was also completed and revealed multiple nonshadowing small echogenic foci along the gallbladder wall most consistent with small polyps, possibly small calculi. No significant gallbladder wall thickening or pericholecystic fluid. CBD was normal in measurement. Liver enzymes are also noted to be elevated slightly. She was given IV Rocephin and given IV fluids. She was admitted to the inpatient unit for further monitoring and treatment of pyelonephritis. Since admission to the nursing unit, IV hydration continues and she is receiving IV Zosyn. She reports her rash is still a bit itchy but feels better. She reports her back pain is improving. Urine culture is pending. She reports that she has not been eating today. She complained of migraine and was given Fioricet with good result.     Past Medical History:     Past Medical History:   Diagnosis Date    E coli infection     24 years ago while pregnant with twins    Migraine     UTI (urinary tract infection)         Past Surgical History:     Patient denies surgical history Medications Prior to Admission:     Prior to Admission medications    Medication Sig Start Date End Date Taking? Authorizing Provider   progesterone (PROMETRIUM) 200 MG capsule Take 400 mg by mouth daily   Yes Historical Provider, MD   ondansetron (ZOFRAN) 4 MG tablet Take 1 tablet by mouth every 6 hours as needed for Nausea or Vomiting 12/19/20  Yes Pennie Soliman MD   cephALEXin (KEFLEX) 500 MG capsule Take 1 capsule by mouth 3 times daily 12/19/20  Yes Pennie Soliman MD   HYDROcodone-acetaminophen (NORCO) 5-325 MG per tablet Take 1 tablet by mouth every 6 hours as needed for Pain for up to 4 days. 12/19/20 12/23/20 Yes Pennie Soliman MD   polyethylene glycol [facility] 17 GM/SCOOP powder Take 17 g by mouth daily PRN constipation 12/19/20  Yes Pennie Soliman MD        Allergies:     Aspirin, Pcn [penicillins], and Ciprofloxacin    Social History:     Tobacco:    reports that she has never smoked. She has never used smokeless tobacco.  Alcohol:      reports current alcohol use. Drug Use:  reports no history of drug use. Family History:     Family History   Problem Relation Age of Onset    No Known Problems Mother     No Known Problems Father     No Known Problems Sister     No Known Problems Brother        Review of Systems:     Positive and Negative as described in HPI. Review of Systems   Constitutional: Positive for appetite change and fever. Negative for chills and fatigue. HENT: Negative. Eyes: Negative. Respiratory: Negative. Cardiovascular: Negative. Gastrointestinal: Positive for constipation, nausea and vomiting. Negative for abdominal pain. Genitourinary: Positive for flank pain. Negative for difficulty urinating and dysuria. Musculoskeletal: Positive for back pain. Negative for arthralgias and myalgias. Skin: Positive for rash. Negative for color change and wound. generalized   Neurological: Positive for headaches. Negative for dizziness and light-headedness. Psychiatric/Behavioral: Negative. Physical Exam:   /83   Pulse 56   Temp 97.3 °F (36.3 °C) (Oral)   Resp 16   Ht 5' 9\" (1.753 m)   Wt 163 lb 12.8 oz (74.3 kg)   LMP 2020   SpO2 99%   BMI 24.19 kg/m²   Temp (24hrs), Av °F (36.7 °C), Min:97.3 °F (36.3 °C), Max:98.3 °F (36.8 °C)        Intake/Output Summary (Last 24 hours) at 2020 1327  Last data filed at 2020 0249  Gross per 24 hour   Intake    Output 250 ml   Net -250 ml       Physical Exam  Vitals signs and nursing note reviewed. Constitutional:       General: She is not in acute distress. Appearance: She is ill-appearing. She is not toxic-appearing or diaphoretic. HENT:      Head: Normocephalic and atraumatic. Right Ear: External ear normal.      Left Ear: External ear normal.      Nose: Nose normal. No rhinorrhea. Mouth/Throat:      Mouth: Mucous membranes are moist.   Eyes:      General: No scleral icterus. Right eye: No discharge. Left eye: No discharge. Extraocular Movements: Extraocular movements intact. Conjunctiva/sclera: Conjunctivae normal.      Pupils: Pupils are equal, round, and reactive to light. Neck:      Musculoskeletal: Normal range of motion and neck supple. Cardiovascular:      Rate and Rhythm: Normal rate and regular rhythm. Pulses: Normal pulses. Heart sounds: Normal heart sounds. No murmur. No friction rub. No gallop. Pulmonary:      Effort: Pulmonary effort is normal.      Breath sounds: Normal breath sounds. Abdominal:      General: Bowel sounds are normal. There is distension. Palpations: Abdomen is soft. Tenderness: There is no abdominal tenderness. There is no right CVA tenderness, left CVA tenderness or guarding. Musculoskeletal: Normal range of motion. Right lower leg: No edema. Left lower leg: No edema. Skin:     General: Skin is warm. Coloration: Skin is not jaundiced. Findings: Rash present. No bruising, erythema or lesion. Neurological:      General: No focal deficit present. Mental Status: She is alert and oriented to person, place, and time. Psychiatric:         Mood and Affect: Mood normal.         Behavior: Behavior normal.         Thought Content: Thought content normal.         Judgment: Judgment normal.         Investigations:      Laboratory Testing:  Recent Results (from the past 24 hour(s))   Urinalysis Reflex to Culture    Collection Time: 12/21/20  2:35 PM    Specimen: Urine, clean catch   Result Value Ref Range    Color, UA YELLOW YELLOW    Turbidity UA CLEAR CLEAR    Glucose, Ur NEGATIVE NEGATIVE    Bilirubin Urine NEGATIVE NEGATIVE    Ketones, Urine NEGATIVE NEGATIVE    Specific Gravity, UA 1.015 1.005 - 1.030    Urine Hgb NEGATIVE NEGATIVE    pH, UA 6.0 5.0 - 8.0    Protein, UA NEGATIVE NEGATIVE    Urobilinogen, Urine Normal Normal    Nitrite, Urine NEGATIVE NEGATIVE    Leukocyte Esterase, Urine NEGATIVE NEGATIVE    Urinalysis Comments       Microscopic exam not performed based on chemical results unless requested in original order. Urinalysis Comments          Urinalysis Comments       Utilizing a urinalysis as the only screening method to exclude a potential uropathogen can be unreliable in many patient populations. Rapid screening tests are less sensitive than culture and if UTI is a clinical possibility, culture should be considered despite a negative urinalysis.    Pregnancy, Urine    Collection Time: 12/21/20  2:35 PM   Result Value Ref Range    HCG(Urine) Pregnancy Test NEGATIVE NEGATIVE   CBC Auto Differential    Collection Time: 12/21/20  2:47 PM   Result Value Ref Range    WBC 3.6 3.5 - 11.0 k/uL    RBC 4.31 4.0 - 5.2 m/uL    Hemoglobin 12.9 12.0 - 16.0 g/dL    Hematocrit 38.4 36 - 46 %    MCV 89.2 80 - 100 fL    MCH 29.9 26 - 34 pg    MCHC 33.5 31 - 37 g/dL    RDW 12.0 (L) 12.5 - 15.4 %    Platelets 560 495 - 798 k/uL MPV 7.3 6.0 - 12.0 fL    NRBC Automated NOT REPORTED per 100 WBC    Differential Type NOT REPORTED     Immature Granulocytes NOT REPORTED 0 %    Absolute Immature Granulocyte NOT REPORTED 0.00 - 0.30 k/uL    WBC Morphology NOT REPORTED     RBC Morphology NOT REPORTED     Platelet Estimate NOT REPORTED     Seg Neutrophils 68 (H) 36 - 66 %    Lymphocytes 16 (L) 24 - 44 %    Monocytes 13 (H) 2 - 11 %    Eosinophils % 2 1 - 4 %    Basophils 1 0 - 2 %    Segs Absolute 2.50 1.8 - 7.7 k/uL    Absolute Lymph # 0.60 (L) 1.0 - 4.8 k/uL    Absolute Mono # 0.50 0.1 - 1.2 k/uL    Absolute Eos # 0.10 0.0 - 0.4 k/uL    Basophils Absolute 0.00 0.0 - 0.2 k/uL   Basic Metabolic Panel    Collection Time: 12/21/20  2:47 PM   Result Value Ref Range    Glucose 110 (H) 70 - 99 mg/dL    BUN 11 6 - 20 mg/dL    CREATININE 0.88 0.50 - 0.90 mg/dL    Bun/Cre Ratio NOT REPORTED 9 - 20    Calcium 8.7 8.6 - 10.4 mg/dL    Sodium 140 135 - 144 mmol/L    Potassium 3.8 3.7 - 5.3 mmol/L    Chloride 104 98 - 107 mmol/L    CO2 23 20 - 31 mmol/L    Anion Gap 13 9 - 17 mmol/L    GFR Non-African American >60 >60 mL/min    GFR African American >60 >60 mL/min    GFR Comment          GFR Staging NOT REPORTED    Hepatic Function Panel    Collection Time: 12/21/20  2:47 PM   Result Value Ref Range    Alb 3.4 (L) 3.5 - 5.2 g/dL    Alkaline Phosphatase 122 (H) 35 - 104 U/L    ALT 93 (H) 5 - 33 U/L    AST 57 (H) <32 U/L    Total Bilirubin 0.22 (L) 0.3 - 1.2 mg/dL    Bilirubin, Direct 0.10 <0.31 mg/dL    Bilirubin, Indirect 0.12 0.00 - 1.00 mg/dL    Total Protein 6.9 6.4 - 8.3 g/dL    Globulin NOT REPORTED 1.5 - 3.8 g/dL    Albumin/Globulin Ratio 1.0 1.0 - 2.5   Lipase    Collection Time: 12/21/20  2:47 PM   Result Value Ref Range    Lipase 13 13 - 60 U/L   Lactate, Sepsis    Collection Time: 12/21/20  2:47 PM   Result Value Ref Range    Lactic Acid, Sepsis 1.2 0.5 - 1.9 mmol/L    Lactic Acid, Sepsis, Whole Blood NOT REPORTED 0.5 - 1.9 mmol/L   COVID-19 Collection Time: 12/21/20  2:53 PM    Specimen: Other   Result Value Ref Range    SARS-CoV-2          SARS-CoV-2, Rapid Not Detected Not Detected    Source . NASOPHARYNGEAL SWAB     SARS-CoV-2         Basic Metabolic Panel w/ Reflex to MG    Collection Time: 12/22/20  6:19 AM   Result Value Ref Range    Glucose 110 (H) 70 - 99 mg/dL    BUN 9 6 - 20 mg/dL    CREATININE 0.77 0.50 - 0.90 mg/dL    Bun/Cre Ratio NOT REPORTED 9 - 20    Calcium 8.2 (L) 8.6 - 10.4 mg/dL    Sodium 141 135 - 144 mmol/L    Potassium 3.8 3.7 - 5.3 mmol/L    Chloride 107 98 - 107 mmol/L    CO2 22 20 - 31 mmol/L    Anion Gap 12 9 - 17 mmol/L    GFR Non-African American >60 >60 mL/min    GFR African American >60 >60 mL/min    GFR Comment          GFR Staging NOT REPORTED    CBC    Collection Time: 12/22/20  6:19 AM   Result Value Ref Range    WBC 3.4 (L) 3.5 - 11.0 k/uL    RBC 4.00 4.0 - 5.2 m/uL    Hemoglobin 11.9 (L) 12.0 - 16.0 g/dL    Hematocrit 35.0 (L) 36 - 46 %    MCV 87.6 80 - 100 fL    MCH 29.7 26 - 34 pg    MCHC 33.9 31 - 37 g/dL    RDW 11.9 (L) 12.5 - 15.4 %    Platelets 306 408 - 847 k/uL    MPV 7.6 6.0 - 12.0 fL    NRBC Automated NOT REPORTED per 100 WBC   Protime-INR    Collection Time: 12/22/20  6:19 AM   Result Value Ref Range    Protime 10.6 9.4 - 12.6 sec    INR 1.0        Imaging/Diagnostics:    Ct Head Wo Contrast    Result Date: 12/21/2020  No acute intracranial abnormality. Us Non Ob Transvaginal    Result Date: 12/22/2020  Nonvisualization of the left ovary. There is normal Doppler flow in the right ovary. Mildly enlarged heterogeneous uterus. No discrete mass and the findings may be related to underlying adenomyosis. Otherwise unremarkable pelvic ultrasound.      Us Pelvis Complete    Result Date: 12/22/2020 Nonvisualization of the left ovary. There is normal Doppler flow in the right ovary. Mildly enlarged heterogeneous uterus. No discrete mass and the findings may be related to underlying adenomyosis. Otherwise unremarkable pelvic ultrasound. Ct Abdomen Pelvis W Iv Contrast Additional Contrast? None    Result Date: 12/21/2020  Left-sided pyelonephritis and mild thickening of the left ureter and bladder likely secondary findings. No loculated perinephric fluid collections. Moderate retained stool throughout colon. Heterogeneous enlarged uterus again identified better evaluated on recent pelvic ultrasound. Ct Abdomen Pelvis W Iv Contrast Additional Contrast? None    Result Date: 12/19/2020  1. Left pyelonephritis. 2. Suspicion for left ureteritis and cystitis. 3. Endometrial thickening for age. Recommend further evaluation with sonography on a nonemergent basis. Uterine heterogeneous enhancement and relative enlargement also be assessed at that time, most likely due to fibroids and/or adenomyosis. 4. Additional incidental findings as above for which no dedicated follow-up is recommend. Us Gallbladder Ruq    Result Date: 12/22/2020  Multiple gallbladder polyps and possible small calculi. No acute features. Otherwise unremarkable right upper quadrant ultrasound. Us Dup Abd Pel Retro Scrot Limited    Result Date: 12/22/2020  Nonvisualization of the left ovary. There is normal Doppler flow in the right ovary. Mildly enlarged heterogeneous uterus. No discrete mass and the findings may be related to underlying adenomyosis. Otherwise unremarkable pelvic ultrasound.        Assessment :      Hospital Problems           Last Modified POA    * (Principal) Pyelonephritis 12/22/2020 Yes    Migraine 12/22/2020 Yes    Generalized pruritus 12/22/2020 Yes    Rash in adult 12/22/2020 Yes    Constipation 12/22/2020 Yes    Transaminitis 12/22/2020 Yes          Plan:     Patient status inpatient in the Med/Surge 1. Pyelonephritis: Follow urine culture. Continue normal saline at 125 mL's per hour. Continue IV Zosyn  2. Migraine: Fioricet as needed. Encourage p.o. intake  3. Generalized pruritus: Benadryl p.o. as needed as ordered  4. Rash: Monitor for resolution or change in appearance  5. Constipation: MiraLAX twice daily. Increase activity. Encourage p.o. intake/fluids  6. Transaminitis: Patient denies abdominal pain. Check CMP in a.m. Consultations:   None     Patient is admitted as inpatient status because of co-morbidities listed above, severity of signs and symptoms as outlined, requirement for current medical therapies and most importantly because of direct risk to patient if care not provided in a hospital setting. Expected length of stay > 48 hours. MAXIMUS Malik NP  12/22/2020  1:27 PM    Copy sent to Dr. Kaylie Hawk primary care provider on file.

## 2020-12-23 VITALS
HEART RATE: 65 BPM | OXYGEN SATURATION: 98 % | HEIGHT: 69 IN | BODY MASS INDEX: 24.56 KG/M2 | SYSTOLIC BLOOD PRESSURE: 138 MMHG | DIASTOLIC BLOOD PRESSURE: 80 MMHG | WEIGHT: 165.79 LBS | TEMPERATURE: 97.9 F | RESPIRATION RATE: 20 BRPM

## 2020-12-23 PROBLEM — R21 RASH IN ADULT: Status: RESOLVED | Noted: 2020-12-22 | Resolved: 2020-12-23

## 2020-12-23 PROBLEM — R74.01 TRANSAMINITIS: Status: RESOLVED | Noted: 2020-12-22 | Resolved: 2020-12-23

## 2020-12-23 PROBLEM — L29.9 GENERALIZED PRURITUS: Status: RESOLVED | Noted: 2020-12-22 | Resolved: 2020-12-23

## 2020-12-23 LAB
ALBUMIN SERPL-MCNC: 3.1 G/DL (ref 3.5–5.2)
ALBUMIN/GLOBULIN RATIO: 1.2 (ref 1–2.5)
ALP BLD-CCNC: 98 U/L (ref 35–104)
ALT SERPL-CCNC: 50 U/L (ref 5–33)
ANION GAP SERPL CALCULATED.3IONS-SCNC: 12 MMOL/L (ref 9–17)
AST SERPL-CCNC: 23 U/L
BILIRUB SERPL-MCNC: 0.21 MG/DL (ref 0.3–1.2)
BUN BLDV-MCNC: 7 MG/DL (ref 6–20)
BUN/CREAT BLD: ABNORMAL (ref 9–20)
CALCIUM SERPL-MCNC: 8 MG/DL (ref 8.6–10.4)
CHLORIDE BLD-SCNC: 108 MMOL/L (ref 98–107)
CO2: 22 MMOL/L (ref 20–31)
CREAT SERPL-MCNC: 0.81 MG/DL (ref 0.5–0.9)
CULTURE: ABNORMAL
CULTURE: ABNORMAL
GFR AFRICAN AMERICAN: >60 ML/MIN
GFR NON-AFRICAN AMERICAN: >60 ML/MIN
GFR SERPL CREATININE-BSD FRML MDRD: ABNORMAL ML/MIN/{1.73_M2}
GFR SERPL CREATININE-BSD FRML MDRD: ABNORMAL ML/MIN/{1.73_M2}
GLUCOSE BLD-MCNC: 85 MG/DL (ref 70–99)
HCT VFR BLD CALC: 32.3 % (ref 36–46)
HEMOGLOBIN: 11.1 G/DL (ref 12–16)
Lab: ABNORMAL
MCH RBC QN AUTO: 30 PG (ref 26–34)
MCHC RBC AUTO-ENTMCNC: 34.4 G/DL (ref 31–37)
MCV RBC AUTO: 87.1 FL (ref 80–100)
NRBC AUTOMATED: ABNORMAL PER 100 WBC
PDW BLD-RTO: 11.9 % (ref 12.5–15.4)
PLATELET # BLD: 233 K/UL (ref 140–450)
PMV BLD AUTO: 7.3 FL (ref 6–12)
POTASSIUM SERPL-SCNC: 3.7 MMOL/L (ref 3.7–5.3)
RBC # BLD: 3.71 M/UL (ref 4–5.2)
SODIUM BLD-SCNC: 142 MMOL/L (ref 135–144)
SPECIMEN DESCRIPTION: ABNORMAL
TOTAL PROTEIN: 5.7 G/DL (ref 6.4–8.3)
WBC # BLD: 6.1 K/UL (ref 3.5–11)

## 2020-12-23 PROCEDURE — 6360000002 HC RX W HCPCS: Performed by: NURSE PRACTITIONER

## 2020-12-23 PROCEDURE — 99238 HOSP IP/OBS DSCHRG MGMT 30/<: CPT | Performed by: NURSE PRACTITIONER

## 2020-12-23 PROCEDURE — 2580000003 HC RX 258: Performed by: NURSE PRACTITIONER

## 2020-12-23 PROCEDURE — 85027 COMPLETE CBC AUTOMATED: CPT

## 2020-12-23 PROCEDURE — 6370000000 HC RX 637 (ALT 250 FOR IP): Performed by: NURSE PRACTITIONER

## 2020-12-23 PROCEDURE — 36415 COLL VENOUS BLD VENIPUNCTURE: CPT

## 2020-12-23 PROCEDURE — 80053 COMPREHEN METABOLIC PANEL: CPT

## 2020-12-23 RX ORDER — POLYETHYLENE GLYCOL 3350 17 G/17G
17 POWDER, FOR SOLUTION ORAL DAILY
Qty: 225 G | Refills: 0 | Status: SHIPPED | OUTPATIENT
Start: 2020-12-23

## 2020-12-23 RX ORDER — CEFDINIR 300 MG/1
300 CAPSULE ORAL 2 TIMES DAILY
Qty: 14 CAPSULE | Refills: 0 | Status: SHIPPED | OUTPATIENT
Start: 2020-12-23 | End: 2020-12-23 | Stop reason: SDUPTHER

## 2020-12-23 RX ORDER — CEFDINIR 300 MG/1
300 CAPSULE ORAL 2 TIMES DAILY
Qty: 14 CAPSULE | Refills: 0 | Status: SHIPPED | OUTPATIENT
Start: 2020-12-23 | End: 2020-12-30

## 2020-12-23 RX ORDER — GREEN TEA/HOODIA GORDONII 315-12.5MG
1 CAPSULE ORAL DAILY
Qty: 14 TABLET | Refills: 0 | Status: SHIPPED | OUTPATIENT
Start: 2020-12-23 | End: 2021-01-06

## 2020-12-23 RX ORDER — GREEN TEA/HOODIA GORDONII 315-12.5MG
1 CAPSULE ORAL DAILY
Qty: 14 TABLET | Refills: 0 | Status: SHIPPED | OUTPATIENT
Start: 2020-12-23 | End: 2020-12-23 | Stop reason: SDUPTHER

## 2020-12-23 RX ADMIN — ENOXAPARIN SODIUM 40 MG: 40 INJECTION SUBCUTANEOUS at 08:11

## 2020-12-23 RX ADMIN — SODIUM CHLORIDE: 9 INJECTION, SOLUTION INTRAVENOUS at 01:58

## 2020-12-23 RX ADMIN — POLYETHYLENE GLYCOL 3350 17 G: 17 POWDER, FOR SOLUTION ORAL at 08:11

## 2020-12-23 RX ADMIN — PIPERACILLIN AND TAZOBACTAM 3.38 G: 3; .375 INJECTION, POWDER, LYOPHILIZED, FOR SOLUTION INTRAVENOUS at 03:08

## 2020-12-23 RX ADMIN — PROGESTERONE 400 MG: 100 CAPSULE ORAL at 08:11

## 2020-12-23 RX ADMIN — BUTALBITAL, ACETAMINOPHEN AND CAFFEINE 1 TABLET: 50; 325; 40 TABLET ORAL at 04:39

## 2020-12-23 RX ADMIN — ONDANSETRON 4 MG: 2 INJECTION INTRAMUSCULAR; INTRAVENOUS at 08:11

## 2020-12-23 RX ADMIN — PROMETHAZINE HYDROCHLORIDE 12.5 MG: 25 TABLET ORAL at 09:22

## 2020-12-23 RX ADMIN — PIPERACILLIN AND TAZOBACTAM 3.38 G: 3; .375 INJECTION, POWDER, LYOPHILIZED, FOR SOLUTION INTRAVENOUS at 11:52

## 2020-12-23 ASSESSMENT — PAIN SCALES - GENERAL
PAINLEVEL_OUTOF10: 0
PAINLEVEL_OUTOF10: 0
PAINLEVEL_OUTOF10: 7

## 2020-12-23 NOTE — PROGRESS NOTES
CLINICAL PHARMACY NOTE: MEDS TO [address] [place] [address] Drive Select Patient?: No  Total # of Prescriptions Filled: 3   The following medications were delivered to the patient:  · OTC Probiotic  · OTC Miralax  · Cefdinir 300mg  Total # of Interventions Completed: 0  Time Spent (min): 0    Additional Documentation:

## 2020-12-23 NOTE — PROGRESS NOTES
[facility]  Office: [address], DO, Liana Gibbs, DO, Ayah James, DO, Blanca Akbar Blood, DO, Mic Minor MD, Michelle Tello MD, Chrystal Das MD, Izabella You MD, Charlee Mendoza MD, Andre Nettles MD, Renato Nguyen MD, Magui Bobby MD, Emily Fan MD, Ana Willard, DO, Flower Guidry MD, Arminda Gamez MD, Oly Jimenez, DO, Rosa Maria Reynolds MD,  Rosy Camarena DO, Wiliam Taylor MD, Mariana Valdez MD, Bee Matos, [facility], [facility], CNP, Jeimy Cheema, CNP, Eliud Espinoza, CNS, Amanda Portillo, CNP, Manisha Gardner, CNP, Blaine Feliciano, CNP, Olimpia Stevenson, CNP, Kalpana Barroso, CNP, Abram Ríos PA-C, Katya Larios, ALEN, Cathryn Alegre, CNP, Kori Gifford, [facility], Tim Lebron, CNP, Ciara Aguayo, CNP, Sebastián Cruz, [facility]   [address] [facility]    Progress Note    12/23/2020    1:34 PM    Name:   Antonette Mendoza  MRN:     [id number]     Acct:      [de-identified]   Room:   [address] [place] [address] Day:  2  Admit Date:  12/21/2020  2:15 PM    PCP:   No primary care provider on file. Code Status:  Full Code    Subjective:     C/C: Feeling better  Chief Complaint   Patient presents with    Abdominal Pain     pt was here on saturday for abdominal pain and it is no better    Headache    Rash     Interval History Status: improved. Patient resting in bed. She denies headache, chest pain, shortness of breath, abdominal pain, urinary symptoms, back pain, nausea, vomiting. She says she was able to eat solid food today and tolerated it well. She would like to go home today. No issues reported overnight per nursing. She is afebrile. Brief History:     Per previous documentation:    Antonette Mendoza is a 54 y.o.  Non-/non  female who presents with Abdominal Pain (pt was here on saturday for abdominal pain and it is no better), Headache, and Rash   and is admitted to the hospital for the management of Pyelonephritis.    Patient reports her symptoms started about a week ago with migraine, chills, fevers, rash. She had a telemedicine visit and was started on Cipro. She reports the rash started after taking Cipro. On Saturday she was seen in the emergency department because she did not feel any better. She was given Keflex at that time and discharged. Since Saturday, she has had increased back pain and throwing up and nausea and she returned to the ED for further evaluation. She says recently over the last couple years she has had 2-3 UTIs per year, and she never had issue with UTIs in the past.  Other significant medical history is migraine.      While in the ED, CT the abdomen pelvis with IV contrast was completed and revealed a left-sided pyelonephritis and mild thickening of the left ureter and bladder. Heterogeneous enlarged uterus was also seen. Right upper quadrant ultrasound was also completed and revealed multiple nonshadowing small echogenic foci along the gallbladder wall most consistent with small polyps, possibly small calculi. No significant gallbladder wall thickening or pericholecystic fluid. CBD was normal in measurement. Liver enzymes are also noted to be elevated slightly. She was given IV Rocephin and given IV fluids. She was admitted to the inpatient unit for further monitoring and treatment of pyelonephritis.     Since admission to the nursing unit, IV hydration continues and she is receiving IV Zosyn. She received Fioricet for headache. No further complaints of rashing. No complaints of itching. Urine culture reviewed resulted as no growth. Due to patient's sensitivity to Cipro, patient given a course of cefdinir to complete treatment of her pyelonephritis. Also discussed need for patient to follow-up with a urologist close to her home in order to monitor bladder wall and left ureter/kidney findings on CT. Patient and patient spouse verbalized understanding.     Review of Systems: Constitutional:  negative for chills, fevers, sweats  Respiratory:  negative for  dyspnea on exertion, shortness of breath, wheezing, + cough  Cardiovascular:  negative for chest pain, chest pressure/discomfort, lower extremity edema, palpitations  Gastrointestinal:  negative for abdominal pain, constipation, diarrhea, nausea, vomiting  Neurological:  negative for dizziness, headache    Medications: Allergies: Allergies   Allergen Reactions    Aspirin     Pcn [Penicillins]     Ciprofloxacin Rash       Current Meds:   Scheduled Meds:    polyethylene glycol  17 g Oral BID    estrogens (conjugated)  0.3 mg Oral Daily    progesterone  400 mg Oral Daily    sodium chloride flush  10 mL Intravenous 2 times per day    enoxaparin  40 mg Subcutaneous Daily    piperacillin-tazobactam  3.375 g Intravenous Q8H     Continuous Infusions:    sodium chloride 75 mL/hr at 20 0802     PRN Meds: traMADol, butalbital-acetaminophen-caffeine, diphenhydrAMINE, sodium chloride flush, nicotine, promethazine **OR** ondansetron, acetaminophen **OR** acetaminophen    Data:     Past Medical History:   has a past medical history of E coli infection, Migraine, and UTI (urinary tract infection). Social History:   reports that she has never smoked. She has never used smokeless tobacco. She reports current alcohol use. She reports that she does not use drugs. Family History:   Family History   Problem Relation Age of Onset    No Known Problems Mother     No Known Problems Father     No Known Problems Sister     No Known Problems Brother        Vitals:  /80   Pulse 65   Temp 97.9 °F (36.6 °C) (Oral)   Resp 20   Ht 5' 9\" (1.753 m)   Wt 165 lb 12.6 oz (75.2 kg)   LMP 2020   SpO2 98%   BMI 24.48 kg/m²   Temp (24hrs), Av.5 °F (36.9 °C), Min:97.9 °F (36.6 °C), Max:99 °F (37.2 °C)    No results for input(s): POCGLU in the last 72 hours.     I/O (24Hr): Intake/Output Summary (Last 24 hours) at 12/23/2020 1334  Last data filed at 12/23/2020 7246  Gross per 24 hour   Intake 210 ml   Output 1950 ml   Net -1740 ml       Labs:  Hematology:  Recent Labs     12/21/20  1447 12/22/20 0619 12/23/20  0524   WBC 3.6 3.4* 6.1   RBC 4.31 4.00 3.71*   HGB 12.9 11.9* 11.1*   HCT 38.4 35.0* 32.3*   MCV 89.2 87.6 87.1   MCH 29.9 29.7 30.0   MCHC 33.5 33.9 34.4   RDW 12.0* 11.9* 11.9*    201 233   MPV 7.3 7.6 7.3   INR  --  1.0  --      Chemistry:  Recent Labs     12/21/20  1447 12/22/20  0619 12/23/20  0524    141 142   K 3.8 3.8 3.7    107 108*   CO2 23 22 22   GLUCOSE 110* 110* 85   BUN 11 9 7   CREATININE 0.88 0.77 0.81   ANIONGAP 13 12 12   LABGLOM >60 >60 >60   GFRAA >60 >60 >60   CALCIUM 8.7 8.2* 8.0*     Recent Labs     12/21/20  1447 12/23/20  0524   PROT 6.9 5.7*   LABALBU 3.4* 3.1*   AST 57* 23   ALT 93* 50*   ALKPHOS 122* 98   BILITOT 0.22* 0.21*   BILIDIR 0.10  --    LIPASE 13  --      ABG:No results found for: POCPH, PHART, PH, POCPCO2, GMV8KAJ, PCO2, POCPO2, PO2ART, PO2, POCHCO3, UWQ5RRB, HCO3, NBEA, PBEA, BEART, BE, THGBART, THB, ZAL2ZQB, KVFL0BOH, O3TJOSVO, O2SAT, FIO2  Lab Results   Component Value Date/Time    SPECIAL NOT REPORTED 12/21/2020 02:45 PM     Lab Results   Component Value Date/Time    CULTURE NO GROWTH 12/21/2020 02:45 PM       Radiology:  Ct Head Wo Contrast    Result Date: 12/21/2020  No acute intracranial abnormality. Us Non Ob Transvaginal    Result Date: 12/22/2020  Nonvisualization of the left ovary. There is normal Doppler flow in the right ovary. Mildly enlarged heterogeneous uterus. No discrete mass and the findings may be related to underlying adenomyosis. Otherwise unremarkable pelvic ultrasound.      Us Pelvis Complete    Result Date: 12/22/2020 Nonvisualization of the left ovary. There is normal Doppler flow in the right ovary. Mildly enlarged heterogeneous uterus. No discrete mass and the findings may be related to underlying adenomyosis. Otherwise unremarkable pelvic ultrasound. Ct Abdomen Pelvis W Iv Contrast Additional Contrast? None    Result Date: 12/21/2020  Left-sided pyelonephritis and mild thickening of the left ureter and bladder likely secondary findings. No loculated perinephric fluid collections. Moderate retained stool throughout colon. Heterogeneous enlarged uterus again identified better evaluated on recent pelvic ultrasound. Ct Abdomen Pelvis W Iv Contrast Additional Contrast? None    Result Date: 12/19/2020  1. Left pyelonephritis. 2. Suspicion for left ureteritis and cystitis. 3. Endometrial thickening for age. Recommend further evaluation with sonography on a nonemergent basis. Uterine heterogeneous enhancement and relative enlargement also be assessed at that time, most likely due to fibroids and/or adenomyosis. 4. Additional incidental findings as above for which no dedicated follow-up is recommend. Us Gallbladder Ruq    Result Date: 12/22/2020  Multiple gallbladder polyps and possible small calculi. No acute features. Otherwise unremarkable right upper quadrant ultrasound. Us Dup Abd Pel Retro Scrot Limited    Result Date: 12/22/2020  Nonvisualization of the left ovary. There is normal Doppler flow in the right ovary. Mildly enlarged heterogeneous uterus. No discrete mass and the findings may be related to underlying adenomyosis. Otherwise unremarkable pelvic ultrasound.        Physical Examination:       General appearance:  alert, cooperative and no distress  Mental Status:  oriented to person, place and time and normal affect  Lungs:  clear to auscultation bilaterally, normal effort  Heart:  regular rate and rhythm, no murmur Abdomen:  soft, nontender, nondistended, normal bowel sounds, no masses, hepatomegaly, splenomegaly  Extremities:  no edema, redness, tenderness in the calves  Skin:  no gross lesions, rashes, induration    Assessment:     Hospital Problems           Last Modified POA    * (Principal) Pyelonephritis 12/22/2020 Yes    Migraine 12/22/2020 Yes    Generalized pruritus 12/22/2020 Yes    Rash in adult 12/22/2020 Yes    Constipation 12/22/2020 Yes    Transaminitis 12/22/2020 Yes          Plan:     1. Cefdinir as ordered upon discharge. Patient instructed to seek medical care immediately if back pain worsens, she develops a fever, develops painful urination, or unable to tolerate food/fluids. Encourage patient to follow-up with a urologist close to her home. 2. Migraine resolved  3. Pruritus resolved  4. Rash resolved  5. Laxative as needed  6. Transaminitis improving. Discharge home today.     MAXIMUS Telles NP  12/23/2020  1:34 PM

## 2020-12-23 NOTE — DISCHARGE SUMMARY
[facility]  Office: [address], DO, Hilda Sorto, DO, Ralph Alford, DO, Paras Russomalka Blood, DO, Jeff Rome MD, Miguel Cardoso MD, Jennifer Yañez MD, Leonor Nguyen MD, Shefali Fitzpatrick MD, Berta Camacho MD, Kyleigh Key MD, Dylan Humphrey MD, Emily Ruano MD, Jazzy Lucio, DO, Rafael Stringer MD, Bruce Schuler MD, Mayra Campos DO, Viola Chase MD,  Myriam Mendieta DO, Vane Membreno MD, Mayito Ruffin MD, Toni Momin, [facility], [facility], [facility], Mahad Baca, CNP, Héctor Flores, CNS, Nevaeh Holm, CNP, Angelia Phillips, CNP, Missy Augustine, CNP, Chao Callaway, CNP, Charles Dias, CNP, Donn Contreras PA-C, Vipul Deras, [facility], Juan Buitrago, CNP, Jeananne Goltz, CNP, Harriet Castaneda, CNP, Kirill Ramirez, CNP, Rey Villatoro, [facility]   [address] [facility]    Discharge Summary     Patient ID: Sung Genao  :  1965   MRN: [id number]     ACCOUNT:  [de-identified]   Patient's PCP: No primary care provider on file.   Admit Date: 2020   Discharge Date: 2020   Length of Stay: 2  Code Status:  Full Code  Admitting Physician: Emily Samano MD  Discharge Physician: MAXIMUS Velasquez NP     Active Discharge Diagnoses:     Hospital Problem Lists:  Principal Problem:    Pyelonephritis  Active Problems:    Migraine    Constipation  Resolved Problems:    Generalized pruritus    Rash in adult    Transaminitis      Admission Condition:  fair     Discharged Condition: good    Hospital Stay:     Hospital Course:  Sung Genao is a 54 y.o. female who was admitted for the management of  Pyelonephritis , presented to ER with Abdominal Pain (pt was here on saturday for abdominal pain and it is no better), Headache, and Rash Symptoms that started about a week ago with migraine, chills, fevers, rash. She was started on Cipro after having a telemedicine visit. After taking Cipro, she developed an itchy rash. She went to the ED because she did not feel any better after taking Cipro and giving Keflex at that time and discharge. She then developed increased back pain and nausea with emesis and return to the ED for further evaluation. She has a past medical history over the last couple years of 2-3 UTIs per year, and she reports that this is new for her as she had never had issues with UTIs previously. CT of the abdomen and pelvis with IV contrast was completed and revealed a left-sided pyelonephritis with mild thickening of the left ureter and bladder. Right upper quadrant ultrasound was also completed and revealed multiple nonshadowing small echogenic foci along the gallbladder wall most consistent with small polyps or small calculi. CBD was normal.  Liver enzymes noted to be slightly elevated initially, but then trended more towards normal.  She received IV Rocephin and given IV fluids. On the nursing unit IV Rocephin was changed to IV Zosyn and she was given Fioricet for migraine headache. While on Zosyn, she had no complaints of rashing or itching. Urine culture revealed no growth. Completion of antimicrobial treatment for left-sided polynephritis, decision was made to use cefdinir for 7 days. She was also given probiotic upon discharge. She was encouraged to follow-up with a urologist close to her home in order to monitor the bladder wall and left ureter/kidney findings on CT.       Significant therapeutic interventions:   IV hydration  IV Rocephin  IV Zosyn  Fioricet    Significant Diagnostic Studies: As above  Labs / Micro:  CBC:   Lab Results   Component Value Date    WBC 6.1 12/23/2020    RBC 3.71 12/23/2020    HGB 11.1 12/23/2020    HCT 32.3 12/23/2020    MCV 87.1 12/23/2020    MCH 30.0 12/23/2020 MCHC 34.4 12/23/2020    RDW 11.9 12/23/2020     12/23/2020     BMP:    Lab Results   Component Value Date    GLUCOSE 85 12/23/2020     12/23/2020    K 3.7 12/23/2020     12/23/2020    CO2 22 12/23/2020    ANIONGAP 12 12/23/2020    BUN 7 12/23/2020    CREATININE 0.81 12/23/2020    BUNCRER NOT REPORTED 12/23/2020    CALCIUM 8.0 12/23/2020    LABGLOM >60 12/23/2020    GFRAA >60 12/23/2020    GFR      12/23/2020    GFR NOT REPORTED 12/23/2020     U/A:    Lab Results   Component Value Date    COLORU YELLOW 12/21/2020    TURBIDITY CLEAR 12/21/2020    SPECGRAV 1.015 12/21/2020    HGBUR NEGATIVE 12/21/2020    PHUR 6.0 12/21/2020    PROTEINU NEGATIVE 12/21/2020    GLUCOSEU NEGATIVE 12/21/2020    KETUA NEGATIVE 12/21/2020    BILIRUBINUR NEGATIVE 12/21/2020    UROBILINOGEN Normal 12/21/2020    NITRU NEGATIVE 12/21/2020    LEUKOCYTESUR NEGATIVE 12/21/2020          Radiology:  Ct Head Wo Contrast    Result Date: 12/21/2020  No acute intracranial abnormality. Us Non Ob Transvaginal    Result Date: 12/22/2020  Nonvisualization of the left ovary. There is normal Doppler flow in the right ovary. Mildly enlarged heterogeneous uterus. No discrete mass and the findings may be related to underlying adenomyosis. Otherwise unremarkable pelvic ultrasound. Us Pelvis Complete    Result Date: 12/22/2020  Nonvisualization of the left ovary. There is normal Doppler flow in the right ovary. Mildly enlarged heterogeneous uterus. No discrete mass and the findings may be related to underlying adenomyosis. Otherwise unremarkable pelvic ultrasound. Ct Abdomen Pelvis W Iv Contrast Additional Contrast? None    Result Date: 12/21/2020  Left-sided pyelonephritis and mild thickening of the left ureter and bladder likely secondary findings. No loculated perinephric fluid collections. Moderate retained stool throughout colon. Heterogeneous enlarged uterus again identified better evaluated on recent pelvic ultrasound. Ct Abdomen Pelvis W Iv Contrast Additional Contrast? None    Result Date: 12/19/2020  1. Left pyelonephritis. 2. Suspicion for left ureteritis and cystitis. 3. Endometrial thickening for age. Recommend further evaluation with sonography on a nonemergent basis. Uterine heterogeneous enhancement and relative enlargement also be assessed at that time, most likely due to fibroids and/or adenomyosis. 4. Additional incidental findings as above for which no dedicated follow-up is recommend. Us Gallbladder Ruq    Result Date: 12/22/2020  Multiple gallbladder polyps and possible small calculi. No acute features. Otherwise unremarkable right upper quadrant ultrasound. Us Dup Abd Pel Retro Scrot Limited    Result Date: 12/22/2020  Nonvisualization of the left ovary. There is normal Doppler flow in the right ovary. Mildly enlarged heterogeneous uterus. No discrete mass and the findings may be related to underlying adenomyosis. Otherwise unremarkable pelvic ultrasound. Consultations:    Consults:     Final Specialist Recommendations/Findings:   None      The patient was seen and examined on day of discharge and this discharge summary is in conjunction with any daily progress note from day of discharge. Discharge plan:     Disposition: Home    Physician Follow Up:   Patient encouraged to find PCP and to follow-up with a urologist close to her home as soon as possible. Requiring Further Evaluation/Follow Up POST HOSPITALIZATION/Incidental Findings:     Patient needs to follow-up with urology regarding CT findings of left ureter and bladder. Diet: regular diet    Activity: As tolerated    Instructions to Patient:     Take medications as prescribed    Seek medical care immediately if experience fever, back pain recurs, pain with urination, abdominal pain, nausea, vomiting, or if unable to maintain oral intake.   Discharge Medications:      Medication List      START taking these medications 12/23/2020  2:21 PM      Thank you Dr. Eloy Burkitt primary care provider on file. for the opportunity to be involved in this patient's care.

## 2020-12-28 NOTE — PROGRESS NOTES
CLINICAL PHARMACY NOTE: MEDS TO [address] Select Patient?: No  Total # of Prescriptions Filled: 1   The following medications were delivered to the patient:  · Cefdinir 300mg  · OTC stool softener and polyethylene (miralax)  Total # of Interventions Completed: 0  Time Spent (min): 0    Additional Documentation:

## 2023-11-13 ENCOUNTER — HOSPITAL ENCOUNTER (OUTPATIENT)
Dept: RADIOLOGY | Facility: HOSPITAL | Age: 58
Discharge: HOME | End: 2023-11-13
Payer: COMMERCIAL

## 2023-11-13 ENCOUNTER — OFFICE VISIT (OUTPATIENT)
Dept: ORTHOPEDIC SURGERY | Facility: HOSPITAL | Age: 58
End: 2023-11-13
Payer: COMMERCIAL

## 2023-11-13 VITALS — WEIGHT: 159 LBS | HEIGHT: 69 IN | BODY MASS INDEX: 23.55 KG/M2

## 2023-11-13 DIAGNOSIS — M25.512 LEFT SHOULDER PAIN, UNSPECIFIED CHRONICITY: Primary | ICD-10-CM

## 2023-11-13 DIAGNOSIS — M25.512 LEFT SHOULDER PAIN, UNSPECIFIED CHRONICITY: ICD-10-CM

## 2023-11-13 DIAGNOSIS — M75.42 IMPINGEMENT SYNDROME OF LEFT SHOULDER: ICD-10-CM

## 2023-11-13 DIAGNOSIS — M25.312 DYSKINESIS OF LEFT SCAPULA: ICD-10-CM

## 2023-11-13 PROCEDURE — 99214 OFFICE O/P EST MOD 30 MIN: CPT | Performed by: PHYSICIAN ASSISTANT

## 2023-11-13 PROCEDURE — 99204 OFFICE O/P NEW MOD 45 MIN: CPT | Performed by: PHYSICIAN ASSISTANT

## 2023-11-13 PROCEDURE — 73030 X-RAY EXAM OF SHOULDER: CPT | Mod: LEFT SIDE | Performed by: RADIOLOGY

## 2023-11-13 PROCEDURE — 73030 X-RAY EXAM OF SHOULDER: CPT | Mod: LT,FY

## 2023-11-13 PROCEDURE — 1036F TOBACCO NON-USER: CPT | Performed by: PHYSICIAN ASSISTANT

## 2023-11-13 RX ORDER — ESTRADIOL 0.05 MG/D
1 PATCH TRANSDERMAL WEEKLY
COMMUNITY
Start: 2022-09-01

## 2023-11-13 ASSESSMENT — PAIN DESCRIPTION - DESCRIPTORS: DESCRIPTORS: ACHING;SHARP

## 2023-11-13 ASSESSMENT — PAIN - FUNCTIONAL ASSESSMENT: PAIN_FUNCTIONAL_ASSESSMENT: 0-10

## 2023-11-13 ASSESSMENT — PAIN SCALES - GENERAL: PAINLEVEL_OUTOF10: 5 - MODERATE PAIN
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
## 2023-11-13 NOTE — PATIENT INSTRUCTIONS
The patient was given a prescription for physical therapy.  Physical therapy is medically necessary to improve strength, balance, range of motion and functional outcomes after injury and/or surgery.    1. Follow stretching exercises that were on a separate handout   2. Hold each stretch for a least 1 minute  3. Do not bounce while stretching  4. Stretch for 10 minutes at a time, 3x a day for 6 weeks then daily  5. Remember, it takes several weeks to a few months of consistent stretching to increase flexibility and decrease symptoms.     You can take OTC ibuprofen/naproxen with food as needed with food for pain.    Practice good posture, sitting up straight with your shoulder blades back. Avoid over head reaching or heavy lifting.    Follow up as needed